# Patient Record
Sex: FEMALE | Race: WHITE | NOT HISPANIC OR LATINO | Employment: OTHER | URBAN - METROPOLITAN AREA
[De-identification: names, ages, dates, MRNs, and addresses within clinical notes are randomized per-mention and may not be internally consistent; named-entity substitution may affect disease eponyms.]

---

## 2023-04-04 LAB
THYROTROPIN (MIU/L) IN SER/PLAS BY DETECTION LIMIT <= 0.05 MIU/L: 5.55 MIU/L (ref 0.44–3.98)
THYROXINE (T4) FREE (NG/DL) IN SER/PLAS: 1.14 NG/DL (ref 0.78–1.48)
TRIIODOTHYRONINE (T3) (NG/DL) IN SER/PLAS: 81 NG/DL (ref 60–200)

## 2023-04-10 PROBLEM — Z90.89 STATUS POST TOTAL THYROIDECTOMY: Status: ACTIVE | Noted: 2023-04-10

## 2023-04-10 PROBLEM — Z98.890 STATUS POST TOTAL THYROIDECTOMY: Status: ACTIVE | Noted: 2023-04-10

## 2023-04-10 PROBLEM — E55.9 VITAMIN D DEFICIENCY: Status: ACTIVE | Noted: 2023-04-10

## 2023-04-10 PROBLEM — L50.9 HIVES: Status: ACTIVE | Noted: 2023-04-10

## 2023-04-10 PROBLEM — R11.0 NAUSEA IN ADULT: Status: ACTIVE | Noted: 2023-04-10

## 2023-04-10 PROBLEM — R00.2 PALPITATIONS: Status: ACTIVE | Noted: 2023-04-10

## 2023-04-10 PROBLEM — E89.0 STATUS POST TOTAL THYROIDECTOMY: Status: ACTIVE | Noted: 2023-04-10

## 2023-04-10 PROBLEM — L27.0 DRUG RASH: Status: ACTIVE | Noted: 2023-04-10

## 2023-04-10 RX ORDER — ACETAMINOPHEN 500 MG
1 TABLET ORAL DAILY
COMMUNITY
Start: 2022-05-26 | End: 2024-02-08 | Stop reason: WASHOUT

## 2023-04-10 RX ORDER — LEVOTHYROXINE SODIUM 75 UG/1
1 TABLET ORAL DAILY
COMMUNITY
End: 2023-10-02 | Stop reason: SDUPTHER

## 2023-04-10 RX ORDER — CALCIUM CARBONATE 500(1250)
2 TABLET ORAL 3 TIMES DAILY
COMMUNITY
Start: 2022-03-15 | End: 2024-02-08 | Stop reason: WASHOUT

## 2023-04-11 ENCOUNTER — APPOINTMENT (OUTPATIENT)
Dept: PRIMARY CARE | Facility: CLINIC | Age: 63
End: 2023-04-11
Payer: COMMERCIAL

## 2023-04-20 ENCOUNTER — OFFICE VISIT (OUTPATIENT)
Dept: PRIMARY CARE | Facility: CLINIC | Age: 63
End: 2023-04-20
Payer: COMMERCIAL

## 2023-04-20 VITALS
BODY MASS INDEX: 16.12 KG/M2 | DIASTOLIC BLOOD PRESSURE: 94 MMHG | TEMPERATURE: 97.4 F | WEIGHT: 91 LBS | SYSTOLIC BLOOD PRESSURE: 182 MMHG

## 2023-04-20 DIAGNOSIS — I10 HYPERTENSION, UNSPECIFIED TYPE: ICD-10-CM

## 2023-04-20 DIAGNOSIS — E03.8 OTHER SPECIFIED HYPOTHYROIDISM: ICD-10-CM

## 2023-04-20 DIAGNOSIS — N30.00 ACUTE CYSTITIS WITHOUT HEMATURIA: Primary | ICD-10-CM

## 2023-04-20 PROBLEM — L27.0 DRUG RASH: Status: RESOLVED | Noted: 2023-04-10 | Resolved: 2023-04-20

## 2023-04-20 LAB
POC APPEARANCE, URINE: CLEAR
POC BILIRUBIN, URINE: NEGATIVE
POC BLOOD, URINE: NEGATIVE
POC COLOR, URINE: YELLOW
POC GLUCOSE, URINE: NEGATIVE MG/DL
POC KETONES, URINE: NEGATIVE MG/DL
POC LEUKOCYTES, URINE: NEGATIVE
POC NITRITE,URINE: NEGATIVE
POC PH, URINE: 6.5 PH
POC PROTEIN, URINE: NEGATIVE MG/DL
POC SPECIFIC GRAVITY, URINE: <=1.005
POC UROBILINOGEN, URINE: 0.2 EU/DL

## 2023-04-20 PROCEDURE — 3080F DIAST BP >= 90 MM HG: CPT | Performed by: INTERNAL MEDICINE

## 2023-04-20 PROCEDURE — 81002 URINALYSIS NONAUTO W/O SCOPE: CPT | Performed by: INTERNAL MEDICINE

## 2023-04-20 PROCEDURE — 3077F SYST BP >= 140 MM HG: CPT | Performed by: INTERNAL MEDICINE

## 2023-04-20 PROCEDURE — 99214 OFFICE O/P EST MOD 30 MIN: CPT | Performed by: INTERNAL MEDICINE

## 2023-04-20 RX ORDER — LOSARTAN POTASSIUM 50 MG/1
50 TABLET ORAL DAILY
Qty: 30 TABLET | Refills: 4 | Status: SHIPPED | OUTPATIENT
Start: 2023-04-20 | End: 2023-05-04 | Stop reason: SDUPTHER

## 2023-04-20 RX ORDER — LEVOTHYROXINE SODIUM 50 UG/1
50 TABLET ORAL
COMMUNITY
Start: 2023-03-21 | End: 2023-10-09

## 2023-04-20 ASSESSMENT — ENCOUNTER SYMPTOMS
HEADACHES: 0
DYSURIA: 0
CONSTIPATION: 0
FEVER: 0
DIARRHEA: 0
NECK PAIN: 0
FREQUENCY: 0
COUGH: 0
ARTHRALGIAS: 0
BACK PAIN: 1
SHORTNESS OF BREATH: 0
BLOOD IN STOOL: 0
DIZZINESS: 0
PALPITATIONS: 0
MYALGIAS: 0
ABDOMINAL PAIN: 0
FATIGUE: 0
CHILLS: 0

## 2023-04-20 NOTE — PROGRESS NOTES
Subjective   Patient ID: Olga Grajeda is a 62 y.o. female.    HPI today for follow-up on uti  She was prescribed antibiotic which she completed. she states that overall symptoms are better.  She would like to get her urine tested in order to see resolution of her UTI.    Review of Systems   Constitutional:  Negative for chills, fatigue and fever.   Respiratory:  Negative for cough and shortness of breath.    Cardiovascular:  Negative for chest pain, palpitations and leg swelling.   Gastrointestinal:  Negative for abdominal pain, blood in stool, constipation and diarrhea.   Endocrine: Negative for cold intolerance and heat intolerance.   Genitourinary:  Negative for dysuria and frequency.   Musculoskeletal:  Positive for back pain. Negative for arthralgias, myalgias and neck pain.   Neurological:  Negative for dizziness and headaches.       Objective   Physical Exam  Vitals reviewed.   Constitutional:       General: She is not in acute distress.     Appearance: Normal appearance.   HENT:      Head: Normocephalic and atraumatic.      Mouth/Throat:      Mouth: Mucous membranes are moist.   Eyes:      Extraocular Movements: Extraocular movements intact.      Conjunctiva/sclera: Conjunctivae normal.      Pupils: Pupils are equal, round, and reactive to light.   Cardiovascular:      Rate and Rhythm: Normal rate and regular rhythm.      Pulses: Normal pulses.   Pulmonary:      Effort: Pulmonary effort is normal. No respiratory distress.      Breath sounds: Normal breath sounds.   Abdominal:      General: Bowel sounds are normal. There is no distension.      Tenderness: There is no abdominal tenderness.   Musculoskeletal:         General: No swelling or tenderness. Normal range of motion.      Cervical back: Normal range of motion.   Skin:     General: Skin is warm.   Neurological:      General: No focal deficit present.      Mental Status: She is alert.      Coordination: Coordination normal.      Gait: Gait normal.    Psychiatric:         Mood and Affect: Mood normal.         Behavior: Behavior normal.         Assessment/Plan   Diagnoses and all orders for this visit:  Acute cystitis without hematuria  Comments:  Recent UTI with antibiotic course completed  Check urinalysis today- normal  monitor sx  Orders:  -     POCT UA (nonautomated) manually resulted  Other specified hypothyroidism  Comments:  Continue with levothyroxine  Follows endocrinology  Hypertension, unspecified type  Comments:  Extremely elevated blood pressure today  Start losartan 50 mg once a day  Patient to follow-up with PCP in 1 month  Orders:  -     losartan (Cozaar) 50 mg tablet; Take 1 tablet (50 mg) by mouth once daily.

## 2023-04-20 NOTE — PATIENT INSTRUCTIONS
You are seen today for follow-up on UTI  Your urine test is normal  Your blood pressure was extremely elevated  Start losartan 1 tablet daily for your blood pressure  Follow-up with PCP in 4 weeks

## 2023-05-02 ENCOUNTER — TELEPHONE (OUTPATIENT)
Dept: PRIMARY CARE | Facility: CLINIC | Age: 63
End: 2023-05-02
Payer: COMMERCIAL

## 2023-05-02 NOTE — TELEPHONE ENCOUNTER
Patient was recently seen in the ER 5/1/23 for elevated B/P. She is very concerned and wants to be seen sooner than next availability in June. Do you recommend she see another provider in the office?

## 2023-05-03 ENCOUNTER — APPOINTMENT (OUTPATIENT)
Dept: PRIMARY CARE | Facility: CLINIC | Age: 63
End: 2023-05-03
Payer: COMMERCIAL

## 2023-05-04 ENCOUNTER — OFFICE VISIT (OUTPATIENT)
Dept: PRIMARY CARE | Facility: CLINIC | Age: 63
End: 2023-05-04
Payer: COMMERCIAL

## 2023-05-04 VITALS
HEIGHT: 63 IN | WEIGHT: 95 LBS | SYSTOLIC BLOOD PRESSURE: 156 MMHG | BODY MASS INDEX: 16.83 KG/M2 | DIASTOLIC BLOOD PRESSURE: 90 MMHG

## 2023-05-04 DIAGNOSIS — I10 PRIMARY HYPERTENSION: Primary | ICD-10-CM

## 2023-05-04 DIAGNOSIS — I10 HYPERTENSION, UNSPECIFIED TYPE: ICD-10-CM

## 2023-05-04 PROCEDURE — 3077F SYST BP >= 140 MM HG: CPT | Performed by: INTERNAL MEDICINE

## 2023-05-04 PROCEDURE — 3080F DIAST BP >= 90 MM HG: CPT | Performed by: INTERNAL MEDICINE

## 2023-05-04 PROCEDURE — 99213 OFFICE O/P EST LOW 20 MIN: CPT | Performed by: INTERNAL MEDICINE

## 2023-05-04 RX ORDER — AMLODIPINE BESYLATE 5 MG/1
5 TABLET ORAL DAILY
Qty: 90 TABLET | Refills: 1 | Status: SHIPPED | OUTPATIENT
Start: 2023-05-04 | End: 2023-09-26

## 2023-05-04 RX ORDER — LOSARTAN POTASSIUM 100 MG/1
100 TABLET ORAL DAILY
Qty: 90 TABLET | Refills: 3 | Status: SHIPPED | OUTPATIENT
Start: 2023-05-04 | End: 2023-08-29 | Stop reason: SDUPTHER

## 2023-05-04 ASSESSMENT — ENCOUNTER SYMPTOMS
PALPITATIONS: 0
CHILLS: 0
SHORTNESS OF BREATH: 0
COUGH: 0
FEVER: 0
POLYDIPSIA: 0

## 2023-05-04 NOTE — PROGRESS NOTES
"Subjective   Patient ID: Olga Grajeda is a 62 y.o. female who presents for No chief complaint on file..    Blood pressure follow-up, post ER blood pressure 180 systolic or higher.  Extended hydralazine.  Increase her Cozaar upon leaving but no new meds started.  Blood pressure is noted today.  Symptoms of chest pain have improved on higher doses and she is tolerating it well.  Medication discussed offered and sent to pharmacy patient will resume home checking and update me accordingly.    Patient also with active symptoms of anxiety considering treatment but not ready at this exact moment we will update me further if needed         Review of Systems   Constitutional:  Negative for chills and fever.   Respiratory:  Negative for cough and shortness of breath.    Cardiovascular:  Negative for chest pain and palpitations.   Endocrine: Negative for polydipsia and polyuria.       Objective   /90   Ht 1.6 m (5' 3\")   Wt (!) 43.1 kg (95 lb)   BMI 16.83 kg/m²     Physical Exam  Constitutional:       Appearance: Normal appearance.   HENT:      Head: Normocephalic and atraumatic.   Eyes:      Extraocular Movements: Extraocular movements intact.      Pupils: Pupils are equal, round, and reactive to light.   Neck:      Thyroid: No thyroid mass or thyromegaly.      Vascular: No carotid bruit.   Cardiovascular:      Rate and Rhythm: Normal rate and regular rhythm.      Heart sounds: No murmur heard.     No friction rub. No gallop.   Pulmonary:      Effort: No respiratory distress.      Breath sounds: No wheezing, rhonchi or rales.   Musculoskeletal:      Cervical back: Neck supple.      Right lower leg: No edema.      Left lower leg: No edema.   Lymphadenopathy:      Cervical: No cervical adenopathy.   Neurological:      Mental Status: She is alert.         Assessment/Plan   Problem List Items Addressed This Visit          Circulatory    Primary hypertension - Primary    Relevant Medications    amLODIPine (Norvasc) " 5 mg tablet     Other Visit Diagnoses       Hypertension, unspecified type        Extremely elevated blood pressure today  Start losartan 50 mg once a day  Patient to follow-up with PCP in 1 month    Relevant Medications    losartan (Cozaar) 100 mg tablet

## 2023-06-01 ENCOUNTER — APPOINTMENT (OUTPATIENT)
Dept: PRIMARY CARE | Facility: CLINIC | Age: 63
End: 2023-06-01
Payer: COMMERCIAL

## 2023-06-13 LAB
ALBUMIN (G/DL) IN SER/PLAS: 4.6 G/DL (ref 3.4–5)
ANION GAP IN SER/PLAS: 12 MMOL/L (ref 10–20)
CALCIDIOL (25 OH VITAMIN D3) (NG/ML) IN SER/PLAS: 34 NG/ML
CALCIUM (MG/DL) IN SER/PLAS: 9.4 MG/DL (ref 8.6–10.6)
CARBON DIOXIDE, TOTAL (MMOL/L) IN SER/PLAS: 32 MMOL/L (ref 21–32)
CHLORIDE (MMOL/L) IN SER/PLAS: 104 MMOL/L (ref 98–107)
CREATININE (MG/DL) IN SER/PLAS: 0.6 MG/DL (ref 0.5–1.05)
GFR FEMALE: >90 ML/MIN/1.73M2
GLUCOSE (MG/DL) IN SER/PLAS: 59 MG/DL (ref 74–99)
PHOSPHATE (MG/DL) IN SER/PLAS: 4 MG/DL (ref 2.5–4.9)
POTASSIUM (MMOL/L) IN SER/PLAS: 4.4 MMOL/L (ref 3.5–5.3)
SODIUM (MMOL/L) IN SER/PLAS: 144 MMOL/L (ref 136–145)
THYROTROPIN (MIU/L) IN SER/PLAS BY DETECTION LIMIT <= 0.05 MIU/L: 3.12 MIU/L (ref 0.44–3.98)
THYROXINE (T4) FREE (NG/DL) IN SER/PLAS: 1.25 NG/DL (ref 0.78–1.48)
UREA NITROGEN (MG/DL) IN SER/PLAS: 9 MG/DL (ref 6–23)

## 2023-08-17 ENCOUNTER — OFFICE VISIT (OUTPATIENT)
Dept: PRIMARY CARE | Facility: CLINIC | Age: 63
End: 2023-08-17
Payer: COMMERCIAL

## 2023-08-17 VITALS
HEART RATE: 90 BPM | WEIGHT: 93.9 LBS | SYSTOLIC BLOOD PRESSURE: 174 MMHG | BODY MASS INDEX: 16.64 KG/M2 | HEIGHT: 63 IN | TEMPERATURE: 97.6 F | DIASTOLIC BLOOD PRESSURE: 93 MMHG

## 2023-08-17 DIAGNOSIS — R10.11 RUQ PAIN: ICD-10-CM

## 2023-08-17 DIAGNOSIS — D41.4 BLADDER POLYPS: ICD-10-CM

## 2023-08-17 DIAGNOSIS — R10.13 EPIGASTRIC PAIN: Primary | ICD-10-CM

## 2023-08-17 DIAGNOSIS — R11.0 NAUSEA: ICD-10-CM

## 2023-08-17 PROCEDURE — 3080F DIAST BP >= 90 MM HG: CPT | Performed by: INTERNAL MEDICINE

## 2023-08-17 PROCEDURE — 3077F SYST BP >= 140 MM HG: CPT | Performed by: INTERNAL MEDICINE

## 2023-08-17 PROCEDURE — 4004F PT TOBACCO SCREEN RCVD TLK: CPT | Performed by: INTERNAL MEDICINE

## 2023-08-17 PROCEDURE — 99214 OFFICE O/P EST MOD 30 MIN: CPT | Performed by: INTERNAL MEDICINE

## 2023-08-17 RX ORDER — PANTOPRAZOLE SODIUM 40 MG/1
40 TABLET, DELAYED RELEASE ORAL 2 TIMES DAILY
Qty: 90 TABLET | Refills: 0 | Status: SHIPPED | OUTPATIENT
Start: 2023-08-17 | End: 2023-08-29 | Stop reason: ALTCHOICE

## 2023-08-17 RX ORDER — FAMOTIDINE 40 MG/1
40 TABLET, FILM COATED ORAL NIGHTLY
Qty: 90 TABLET | Refills: 0 | Status: SHIPPED | OUTPATIENT
Start: 2023-08-17 | End: 2023-08-29 | Stop reason: SDUPTHER

## 2023-08-17 RX ORDER — SUCRALFATE 1 G/1
1 TABLET ORAL
Qty: 28 TABLET | Refills: 0 | Status: SHIPPED | OUTPATIENT
Start: 2023-08-17 | End: 2023-08-29 | Stop reason: ALTCHOICE

## 2023-08-17 ASSESSMENT — ENCOUNTER SYMPTOMS
DIARRHEA: 0
ABDOMINAL PAIN: 1
PALPITATIONS: 0
NAUSEA: 1
PAIN: 1
SHORTNESS OF BREATH: 0
BLOOD IN STOOL: 0
POLYDIPSIA: 0
COUGH: 0
FEVER: 0
CONSTIPATION: 0
CHILLS: 0
ABDOMINAL DISTENTION: 1
VOMITING: 0

## 2023-08-17 NOTE — PROGRESS NOTES
"Subjective   Patient ID: Olga Grajeda is a 62 y.o. female who presents for Follow-up and Pain.    Bilateral upper abdomen pain, onset mid may. Progressing since mid may pain escalated to the point she made a trip to the emergency room with a detailed evaluation that included negative cardiac.  Examination normal liver and pancreatic enzymes and a negative CT abdomen.  She was found to be positive for UTI and treated by Macrobid.  She has been having epigastric pain, nausea recurrently without symptoms of diarrhea constipation blood in the stool vomiting.  She has a big gastric discomfort and reports symptoms of bloating.  There is no radiation to the chest jaw or back.  She does smoke.  On examination today the area of greatest pain with a little involuntary guarding is the right upper quadrant over the gallbladder.    Pain  Associated symptoms include abdominal pain and nausea. Pertinent negatives include no chest pain, constipation, diarrhea, fever, shortness of breath or vomiting.        Review of Systems   Constitutional:  Negative for chills and fever.   Respiratory:  Negative for cough and shortness of breath.    Cardiovascular:  Negative for chest pain and palpitations.   Gastrointestinal:  Positive for abdominal distention, abdominal pain and nausea. Negative for blood in stool, constipation, diarrhea and vomiting.   Endocrine: Negative for polydipsia and polyuria.       Objective   BP (!) 174/93 (BP Location: Left arm, Patient Position: Sitting, BP Cuff Size: Small adult)   Pulse 90   Temp 36.4 °C (97.6 °F) (Temporal)   Ht 1.6 m (5' 3\")   Wt (!) 42.6 kg (93 lb 14.4 oz)   BMI 16.63 kg/m²     Physical Exam  Constitutional:       Appearance: Normal appearance.   HENT:      Head: Normocephalic and atraumatic.   Eyes:      Extraocular Movements: Extraocular movements intact.      Pupils: Pupils are equal, round, and reactive to light.   Neck:      Thyroid: No thyroid mass or thyromegaly.      " Vascular: No carotid bruit.   Cardiovascular:      Rate and Rhythm: Normal rate and regular rhythm.      Heart sounds: No murmur heard.     No friction rub. No gallop.   Pulmonary:      Effort: No respiratory distress.      Breath sounds: No wheezing, rhonchi or rales.   Abdominal:      General: There is distension.      Tenderness: There is abdominal tenderness (RUQ greatest). There is guarding (RUQ). There is no rebound.   Musculoskeletal:      Cervical back: Neck supple.      Right lower leg: No edema.      Left lower leg: No edema.   Lymphadenopathy:      Cervical: No cervical adenopathy.   Neurological:      Mental Status: She is alert.         Assessment/Plan   Problem List Items Addressed This Visit    None  Visit Diagnoses       Epigastric pain    -  Primary    Relevant Medications    sucralfate (Carafate) 1 gram tablet    pantoprazole (ProtoNix) 40 mg EC tablet    famotidine (Pepcid) 40 mg tablet    Other Relevant Orders    NM hepatobiliary w cholecystokinin    Bladder polyps        RUQ pain        Relevant Orders    NM hepatobiliary w cholecystokinin    Nausea        Relevant Orders    NM hepatobiliary w cholecystokinin

## 2023-08-25 DIAGNOSIS — T78.40XA ALLERGIC REACTION, INITIAL ENCOUNTER: Primary | ICD-10-CM

## 2023-08-25 DIAGNOSIS — K59.00 CONSTIPATION, UNSPECIFIED CONSTIPATION TYPE: ICD-10-CM

## 2023-08-25 RX ORDER — SENNOSIDES 8.6 MG/1
8.6 CAPSULE, GELATIN COATED ORAL NIGHTLY PRN
Qty: 10 CAPSULE | Refills: 0 | Status: SHIPPED | OUTPATIENT
Start: 2023-08-25 | End: 2024-02-08 | Stop reason: WASHOUT

## 2023-08-25 RX ORDER — HYDROXYZINE PAMOATE 25 MG/1
25 CAPSULE ORAL 3 TIMES DAILY PRN
Qty: 9 CAPSULE | Refills: 0 | Status: SHIPPED | OUTPATIENT
Start: 2023-08-25 | End: 2023-08-29 | Stop reason: ALTCHOICE

## 2023-08-25 RX ORDER — DOCUSATE SODIUM 250 MG
1 CAPSULE ORAL DAILY
Qty: 30 CAPSULE | Refills: 0 | Status: SHIPPED | OUTPATIENT
Start: 2023-08-25 | End: 2024-02-08 | Stop reason: WASHOUT

## 2023-08-25 RX ORDER — PREDNISONE 20 MG/1
40 TABLET ORAL DAILY
Qty: 10 TABLET | Refills: 0 | Status: SHIPPED | OUTPATIENT
Start: 2023-08-25 | End: 2023-08-30

## 2023-08-29 ENCOUNTER — OFFICE VISIT (OUTPATIENT)
Dept: PRIMARY CARE | Facility: CLINIC | Age: 63
End: 2023-08-29
Payer: COMMERCIAL

## 2023-08-29 VITALS — TEMPERATURE: 97.7 F | HEART RATE: 97 BPM | SYSTOLIC BLOOD PRESSURE: 151 MMHG | DIASTOLIC BLOOD PRESSURE: 86 MMHG

## 2023-08-29 DIAGNOSIS — R06.00 DYSPNEA, UNSPECIFIED TYPE: ICD-10-CM

## 2023-08-29 DIAGNOSIS — R10.11 RUQ PAIN: ICD-10-CM

## 2023-08-29 DIAGNOSIS — R11.0 NAUSEA: ICD-10-CM

## 2023-08-29 DIAGNOSIS — I10 HYPERTENSION, UNSPECIFIED TYPE: ICD-10-CM

## 2023-08-29 DIAGNOSIS — Z72.0 TOBACCO USE: ICD-10-CM

## 2023-08-29 DIAGNOSIS — R10.13 EPIGASTRIC PAIN: Primary | ICD-10-CM

## 2023-08-29 PROCEDURE — 3077F SYST BP >= 140 MM HG: CPT | Performed by: INTERNAL MEDICINE

## 2023-08-29 PROCEDURE — 99213 OFFICE O/P EST LOW 20 MIN: CPT | Performed by: INTERNAL MEDICINE

## 2023-08-29 PROCEDURE — 3079F DIAST BP 80-89 MM HG: CPT | Performed by: INTERNAL MEDICINE

## 2023-08-29 PROCEDURE — 4004F PT TOBACCO SCREEN RCVD TLK: CPT | Performed by: INTERNAL MEDICINE

## 2023-08-29 RX ORDER — FLUTICASONE FUROATE, UMECLIDINIUM BROMIDE AND VILANTEROL TRIFENATATE 200; 62.5; 25 UG/1; UG/1; UG/1
1 POWDER RESPIRATORY (INHALATION) DAILY
Qty: 1 EACH | Refills: 0 | Status: ON HOLD | COMMUNITY
Start: 2023-08-29 | End: 2024-03-19 | Stop reason: ALTCHOICE

## 2023-08-29 RX ORDER — ONDANSETRON 4 MG/1
4 TABLET, ORALLY DISINTEGRATING ORAL EVERY 8 HOURS PRN
COMMUNITY
Start: 2023-08-16 | End: 2024-02-08 | Stop reason: WASHOUT

## 2023-08-29 RX ORDER — LOSARTAN POTASSIUM 50 MG/1
50 TABLET ORAL 2 TIMES DAILY
Qty: 180 TABLET | Refills: 3 | Status: SHIPPED | OUTPATIENT
Start: 2023-08-29 | End: 2024-08-28

## 2023-08-29 RX ORDER — FAMOTIDINE 40 MG/1
40 TABLET, FILM COATED ORAL 2 TIMES DAILY
Qty: 90 TABLET | Refills: 1 | Status: SHIPPED | OUTPATIENT
Start: 2023-08-29 | End: 2023-12-05 | Stop reason: SDUPTHER

## 2023-08-29 ASSESSMENT — ENCOUNTER SYMPTOMS
CHILLS: 0
FEVER: 0
COUGH: 0
PALPITATIONS: 0
SHORTNESS OF BREATH: 0
POLYDIPSIA: 0

## 2023-08-29 NOTE — PROGRESS NOTES
Subjective   Patient ID: Olga Grajeda is a 62 y.o. female who presents for Follow-up (EXTREME PAIN).    62-year-old female presents in close follow-up after complications of prior treatment plan.  She developed what appears to be a drug rash and now fine lacy erythematous papular rash diffusely on her body that was itchy.  Resolved quickly upon discontinuation of pantoprazole and sucralfate.  Also Vistaril helped a lot.  She never took the steroid.    Previous issues as discussed at prior appointment continue to be present.  Patient also has some experience of respiratory symptoms like shortness of breath associated with her chronic tobacco use.  Trial of COPD inhaler medication was provided today to evaluate for if there is any contribution on COPD and the patient's current symptom pathway.  I think this is possible for some of the epigastric symptoms but most likely not significantly related as bloating abdominal discomfort constipation and many of the other symptoms the patient expresses would not typically be associated with COPD.  She has a HIDA scan scheduled for next week again this was because a significant amount of her symptoms resulted from right upper abdomen pain right upper quadrant pain nausea postprandial with food intolerance.         Review of Systems   Constitutional:  Negative for chills and fever.   Respiratory:  Negative for cough and shortness of breath.    Cardiovascular:  Negative for chest pain and palpitations.   Endocrine: Negative for polydipsia and polyuria.       Objective   /86 (BP Location: Right arm, Patient Position: Sitting, BP Cuff Size: Small child)   Pulse 97   Temp 36.5 °C (97.7 °F) (Temporal)     Physical Exam  Constitutional:       Appearance: Normal appearance.   HENT:      Head: Normocephalic and atraumatic.   Eyes:      Extraocular Movements: Extraocular movements intact.      Pupils: Pupils are equal, round, and reactive to light.   Neck:      Thyroid: No  thyroid mass or thyromegaly.      Vascular: No carotid bruit.   Cardiovascular:      Rate and Rhythm: Normal rate and regular rhythm.      Heart sounds: No murmur heard.     No friction rub. No gallop.   Pulmonary:      Effort: No respiratory distress.      Breath sounds: No wheezing, rhonchi or rales.   Musculoskeletal:      Cervical back: Neck supple.      Right lower leg: No edema.      Left lower leg: No edema.   Lymphadenopathy:      Cervical: No cervical adenopathy.   Neurological:      Mental Status: She is alert.         Assessment/Plan   Problem List Items Addressed This Visit    None  Visit Diagnoses       Epigastric pain    -  Primary    Relevant Medications    famotidine (Pepcid) 40 mg tablet    RUQ pain        Nausea        Tobacco use        Dyspnea, unspecified type        Hypertension, unspecified type        Extremely elevated blood pressure today  Start losartan 50 mg once a day  Patient to follow-up with PCP in 1 month    Relevant Medications    losartan (Cozaar) 50 mg tablet

## 2023-08-30 LAB — URINE CULTURE: NORMAL

## 2023-09-05 DIAGNOSIS — R10.13 EPIGASTRIC PAIN: Primary | ICD-10-CM

## 2023-09-05 NOTE — RESULT ENCOUNTER NOTE
Patient results of HIDA scan are as follows: Gallbladder ejection fraction and function is rated to be just below the normal level 37% when normal is considered above 38%.  Based on patient's presentation of symptoms and advising the following: With the significance of the symptoms and the multiple possible etiologies, before we go talking about a gallbladder removal with the surgeon I did advise seeing a gastroenterologist and considering an endoscopy to evaluate the stomach.  However this testing does show that the gallbladder could be very much involved and pending the results of an endoscopy with the GI specialist intervention against that with the surgeon may be necessary for long-term treatment.

## 2023-09-08 ENCOUNTER — HOSPITAL ENCOUNTER (OUTPATIENT)
Dept: DATA CONVERSION | Facility: HOSPITAL | Age: 63
Discharge: HOME | End: 2023-09-08
Payer: COMMERCIAL

## 2023-09-08 DIAGNOSIS — D49.4 NEOPLASM OF UNSPECIFIED BEHAVIOR OF BLADDER: ICD-10-CM

## 2023-09-08 DIAGNOSIS — Z01.818 ENCOUNTER FOR OTHER PREPROCEDURAL EXAMINATION: ICD-10-CM

## 2023-09-08 LAB
ALBUMIN SERPL-MCNC: 4.8 GM/DL (ref 3.5–5)
ALBUMIN/GLOB SERPL: 1.6 RATIO (ref 1.5–3)
ALP BLD-CCNC: 94 U/L (ref 35–125)
ALT SERPL-CCNC: 14 U/L (ref 5–40)
ANION GAP SERPL CALCULATED.3IONS-SCNC: 10 MMOL/L (ref 0–19)
ANTICOAGULANT: NORMAL
APTT PPP: 27 SEC (ref 22–32.5)
AST SERPL-CCNC: 18 U/L (ref 5–40)
BASOPHILS # BLD AUTO: 0.1 K/UL (ref 0–0.22)
BASOPHILS NFR BLD AUTO: 1 % (ref 0–1)
BILIRUB SERPL-MCNC: 0.5 MG/DL (ref 0.1–1.2)
BUN SERPL-MCNC: 8 MG/DL (ref 8–25)
BUN/CREAT SERPL: 11.4 RATIO (ref 8–21)
CALCIUM SERPL-MCNC: 9.5 MG/DL (ref 8.5–10.4)
CHLORIDE SERPL-SCNC: 103 MMOL/L (ref 97–107)
CO2 SERPL-SCNC: 28 MMOL/L (ref 24–31)
CREAT SERPL-MCNC: 0.7 MG/DL (ref 0.4–1.6)
DEPRECATED RDW RBC AUTO: 34.9 FL (ref 37–54)
DIFFERENTIAL METHOD BLD: ABNORMAL
EOSINOPHIL # BLD AUTO: 0.19 K/UL (ref 0–0.45)
EOSINOPHIL NFR BLD: 1.9 % (ref 0–3)
ERYTHROCYTE [DISTWIDTH] IN BLOOD BY AUTOMATED COUNT: 16.4 % (ref 11.7–15)
GFR SERPL CREATININE-BSD FRML MDRD: 98 ML/MIN/1.73 M2
GLOBULIN SER-MCNC: 3 G/DL (ref 1.9–3.7)
GLUCOSE SERPL-MCNC: 102 MG/DL (ref 65–99)
HCT VFR BLD AUTO: 43.3 % (ref 36–44)
HGB BLD-MCNC: 13.3 GM/DL (ref 12–15)
IMM GRANULOCYTES # BLD AUTO: 0.01 K/UL (ref 0–0.1)
INR PPP: 1 (ref 0.86–1.16)
LYMPHOCYTES # BLD AUTO: 0.95 K/UL (ref 1.2–3.2)
LYMPHOCYTES NFR BLD MANUAL: 9.5 % (ref 20–40)
MAGNESIUM SERPL-MCNC: 2.1 MG/DL (ref 1.6–3.1)
MCH RBC QN AUTO: 20.1 PG (ref 26–34)
MCHC RBC AUTO-ENTMCNC: 30.7 % (ref 31–37)
MCV RBC AUTO: 65.4 FL (ref 80–100)
MONOCYTES # BLD AUTO: 0.86 K/UL (ref 0–0.8)
MONOCYTES NFR BLD MANUAL: 8.6 % (ref 0–8)
NEUTROPHILS # BLD AUTO: 7.92 K/UL
NEUTROPHILS # BLD AUTO: 7.92 K/UL (ref 1.8–7.7)
NEUTROPHILS.IMMATURE NFR BLD: 0.1 % (ref 0–1)
NEUTS SEG NFR BLD: 78.9 % (ref 50–70)
PLATELET # BLD AUTO: 408 K/UL (ref 150–450)
PMV BLD AUTO: 10.3 CU (ref 7–12.6)
POTASSIUM SERPL-SCNC: 3.9 MMOL/L (ref 3.4–5.1)
PROT SERPL-MCNC: 7.8 G/DL (ref 5.9–7.9)
PROTHROMBIN TIME: 9.9 SEC (ref 9.3–12.7)
RBC # BLD AUTO: 6.62 M/UL (ref 4–4.9)
SODIUM SERPL-SCNC: 141 MMOL/L (ref 133–145)
WBC # BLD AUTO: 10 K/UL (ref 4.5–11)

## 2023-09-19 ENCOUNTER — HOSPITAL ENCOUNTER (OUTPATIENT)
Dept: DATA CONVERSION | Facility: HOSPITAL | Age: 63
Discharge: HOME | End: 2023-09-19
Payer: COMMERCIAL

## 2023-09-19 DIAGNOSIS — E03.9 HYPOTHYROIDISM, UNSPECIFIED: ICD-10-CM

## 2023-09-19 DIAGNOSIS — D49.4 NEOPLASM OF UNSPECIFIED BEHAVIOR OF BLADDER: ICD-10-CM

## 2023-09-19 DIAGNOSIS — F17.210 NICOTINE DEPENDENCE, CIGARETTES, UNCOMPLICATED: ICD-10-CM

## 2023-09-19 DIAGNOSIS — J44.9 CHRONIC OBSTRUCTIVE PULMONARY DISEASE, UNSPECIFIED (MULTI): ICD-10-CM

## 2023-09-19 DIAGNOSIS — K21.9 GASTRO-ESOPHAGEAL REFLUX DISEASE WITHOUT ESOPHAGITIS: ICD-10-CM

## 2023-09-19 DIAGNOSIS — D09.0 CARCINOMA IN SITU OF BLADDER: ICD-10-CM

## 2023-09-19 DIAGNOSIS — I10 ESSENTIAL (PRIMARY) HYPERTENSION: ICD-10-CM

## 2023-09-20 ENCOUNTER — APPOINTMENT (OUTPATIENT)
Dept: PRIMARY CARE | Facility: CLINIC | Age: 63
End: 2023-09-20
Payer: COMMERCIAL

## 2023-09-25 DIAGNOSIS — I10 PRIMARY HYPERTENSION: ICD-10-CM

## 2023-09-26 RX ORDER — AMLODIPINE BESYLATE 5 MG/1
5 TABLET ORAL DAILY
Qty: 90 TABLET | Refills: 1 | Status: SHIPPED | OUTPATIENT
Start: 2023-09-26 | End: 2024-04-26

## 2023-10-02 ENCOUNTER — TELEPHONE (OUTPATIENT)
Dept: PRIMARY CARE | Facility: HOSPITAL | Age: 63
End: 2023-10-02
Payer: COMMERCIAL

## 2023-10-02 DIAGNOSIS — E89.0 STATUS POST TOTAL THYROIDECTOMY: Primary | ICD-10-CM

## 2023-10-02 NOTE — TELEPHONE ENCOUNTER
Rx Refill Request Telephone Encounter    Name:  Olga Grajeda  :  [id number]  Medication Name:  Synthroid   50mcg/75mcg  Oral  Daily  90 days supply  Directions :    Specific Pharmacy location:  [facility]  Date of last appointment:    Date of next appointment:    Best number to reach patient:  [phone]

## 2023-10-03 NOTE — TELEPHONE ENCOUNTER
Patient called stating she has yet to receive the medication previously requested. She states she only has two pills left.

## 2023-10-05 ENCOUNTER — APPOINTMENT (OUTPATIENT)
Dept: PRIMARY CARE | Facility: CLINIC | Age: 63
End: 2023-10-05
Payer: COMMERCIAL

## 2023-10-05 ENCOUNTER — OFFICE VISIT (OUTPATIENT)
Dept: UROLOGY | Facility: CLINIC | Age: 63
End: 2023-10-05
Payer: COMMERCIAL

## 2023-10-05 VITALS — BODY MASS INDEX: 16.63 KG/M2 | HEIGHT: 63 IN | TEMPERATURE: 97.6 F

## 2023-10-05 DIAGNOSIS — C67.9 MALIGNANT NEOPLASM OF URINARY BLADDER, UNSPECIFIED SITE (MULTI): Primary | ICD-10-CM

## 2023-10-05 PROCEDURE — 4004F PT TOBACCO SCREEN RCVD TLK: CPT | Performed by: STUDENT IN AN ORGANIZED HEALTH CARE EDUCATION/TRAINING PROGRAM

## 2023-10-05 PROCEDURE — 99214 OFFICE O/P EST MOD 30 MIN: CPT | Performed by: STUDENT IN AN ORGANIZED HEALTH CARE EDUCATION/TRAINING PROGRAM

## 2023-10-05 ASSESSMENT — PAIN SCALES - GENERAL: PAINLEVEL: 0-NO PAIN

## 2023-10-05 NOTE — PROGRESS NOTES
HPI:  Proc (9/19/23): TURBT   Path: non-invasive papillary urothelial carcinoma, low-grade     63 year old female self-referred for bladder mass. Hx of HTN. Patient is a smoker. Recently seen in the ED (8/8/23) for upper abdominal pain, hematuria, and dysuria. CT AP (8/9/23) showed no evidence of acute abnormality in the abdomen/pelvis, partially visualized emphysematous lung changes, there is a 1.2 cm nodularity in the right posterior dependent portion of the bladder without associated bladder wall thickening, which is nonspecific. S/p TURBT (9/19/23) with pathology showing non-invasive papillary urothelial carcinoma, low-grade. Doing well.      Smoker   Hx: HTN  SHx: thyroidectomy (3/22)   FHx: father (cause of death) and brother had bladder cancer, both worked with chemicals.      CT AP (8/9/23): no evidence of acute abnormality in the abdomen/pelvis, partially visualized emphysematous lung changes, there is a 1.2 cm nodularity in the right posterior dependent portion of the bladder without associated bladder wall thickening, which is nonspecific.     Review of Systems:  All systems reviewed. Anything negative noted in the HPI.    Physical Exam:  Vitals signs reviewed.  Constitutional:      Appearance: Well-developed.  HENT:     Head: Normocephalic and atraumatic.  Neck:     Musculoskeletal: Normal range of motion.  Pulmonary:     Effort: Pulmonary effort is normal.  Musculoskeletal: Normal range of motion.  Skin:     General: Skin is warm and dry.  Neurological:     Mental Status: Alert and oriented to person, place, and time.  Psychiatric:        Behavior: Behavior normal.        Thought Content: Thought content normal.        Judgment: Judgment normal.  Abdominal:     Palpations: Abdomen is soft. There is no mass.     Tenderness: There is no tenderness. There is no guarding or rebound.     Hernia: No hernia is present.     Comments:     Assessment/Plan   63 year old female self-referred for bladder mass. Hx of  HTN. Patient is a smoker. Recently seen in the ED (8/8/23) for upper abdominal pain, hematuria, and dysuria. CT AP (8/9/23) showed no evidence of acute abnormality in the abdomen/pelvis, partially visualized emphysematous lung changes, there is a 1.2 cm nodularity in the right posterior dependent portion of the bladder without associated bladder wall thickening, which is nonspecific. S/p TURBT (9/19/23) with pathology showing non-invasive papillary urothelial carcinoma, low-grade. Doing well. Management options including risks, benefits and alternatives discussed at length and all questions answered. Patient prefers to proceed with follow-up in 3mos for cystoscopy.               By signing my name below, I, India Ledezma, attest that this documentation has been prepared under the direction and in the presence of Dr. Idris Gutierrez.  All medical record entries made by the Scribe were at my direction and personally dictated by me. I have reviewed the chart and agree that the record accurately reflects my personal performance of the history, physical exam, discussion and plan.

## 2023-10-09 RX ORDER — LEVOTHYROXINE SODIUM 75 UG/1
75 TABLET ORAL DAILY
Qty: 30 TABLET | Refills: 1 | Status: SHIPPED | OUTPATIENT
Start: 2023-10-09 | End: 2024-03-19

## 2023-10-10 ENCOUNTER — APPOINTMENT (OUTPATIENT)
Dept: PRIMARY CARE | Facility: CLINIC | Age: 63
End: 2023-10-10
Payer: COMMERCIAL

## 2023-10-26 ENCOUNTER — APPOINTMENT (OUTPATIENT)
Dept: PRIMARY CARE | Facility: CLINIC | Age: 63
End: 2023-10-26
Payer: COMMERCIAL

## 2023-11-09 ENCOUNTER — APPOINTMENT (OUTPATIENT)
Dept: PRIMARY CARE | Facility: CLINIC | Age: 63
End: 2023-11-09
Payer: COMMERCIAL

## 2023-12-05 ENCOUNTER — OFFICE VISIT (OUTPATIENT)
Dept: PRIMARY CARE | Facility: CLINIC | Age: 63
End: 2023-12-05
Payer: COMMERCIAL

## 2023-12-05 VITALS
DIASTOLIC BLOOD PRESSURE: 83 MMHG | BODY MASS INDEX: 15.77 KG/M2 | WEIGHT: 89 LBS | TEMPERATURE: 97.5 F | SYSTOLIC BLOOD PRESSURE: 133 MMHG | HEART RATE: 87 BPM

## 2023-12-05 DIAGNOSIS — K82.8 DYSFUNCTIONAL GALLBLADDER: ICD-10-CM

## 2023-12-05 DIAGNOSIS — K21.9 GASTROESOPHAGEAL REFLUX DISEASE WITHOUT ESOPHAGITIS: ICD-10-CM

## 2023-12-05 DIAGNOSIS — R11.0 NAUSEA IN ADULT: Primary | ICD-10-CM

## 2023-12-05 DIAGNOSIS — R10.13 EPIGASTRIC PAIN: ICD-10-CM

## 2023-12-05 PROBLEM — E03.2 HYPOTHYROIDISM, IATROGENIC: Status: ACTIVE | Noted: 2023-12-05

## 2023-12-05 PROCEDURE — 4004F PT TOBACCO SCREEN RCVD TLK: CPT | Performed by: INTERNAL MEDICINE

## 2023-12-05 PROCEDURE — 99214 OFFICE O/P EST MOD 30 MIN: CPT | Performed by: INTERNAL MEDICINE

## 2023-12-05 PROCEDURE — 3079F DIAST BP 80-89 MM HG: CPT | Performed by: INTERNAL MEDICINE

## 2023-12-05 PROCEDURE — 3075F SYST BP GE 130 - 139MM HG: CPT | Performed by: INTERNAL MEDICINE

## 2023-12-05 RX ORDER — ESOMEPRAZOLE MAGNESIUM 40 MG/1
40 CAPSULE, DELAYED RELEASE ORAL
Qty: 90 CAPSULE | Refills: 0 | Status: SHIPPED | OUTPATIENT
Start: 2023-12-05 | End: 2024-03-04

## 2023-12-05 RX ORDER — FAMOTIDINE 40 MG/1
40 TABLET, FILM COATED ORAL 2 TIMES DAILY
Qty: 90 TABLET | Refills: 1 | Status: SHIPPED | OUTPATIENT
Start: 2023-12-05 | End: 2024-03-18

## 2023-12-05 ASSESSMENT — ENCOUNTER SYMPTOMS
CHILLS: 0
SHORTNESS OF BREATH: 0
PAIN: 1
VOMITING: 0
DIARRHEA: 0
POLYDIPSIA: 0
COUGH: 0
PALPITATIONS: 0
ABDOMINAL PAIN: 1
CONSTIPATION: 0
FEVER: 0
NAUSEA: 1

## 2023-12-05 NOTE — PROGRESS NOTES
"Subjective   Patient ID: Olga Grajeda is a 63 y.o. female who presents for Follow-up.    Prior evaluation: \"Bilateral upper abdomen pain, onset mid may. Progressing since mid may pain escalated to the point she made a trip to the emergency room with a detailed evaluation that included negative cardiac.  Examination normal liver and pancreatic enzymes and a negative CT abdomen.  She was found to be positive for UTI and treated by Macrobid.  She has been having epigastric pain, nausea recurrently without symptoms of diarrhea constipation blood in the stool vomiting.  She has a big gastric discomfort and reports symptoms of bloating.  There is no radiation to the chest jaw or back.  She does smoke.  On examination today the area of greatest pain with a little involuntary guarding is the right upper quadrant over the gallbladder.\"    Since previous encounters the patient was intolerant of 2 of the 3 previous acid medications back in August due to developed drug rash.  She has remained on the Pepcid and does note there are benefits to taking it in reducing acid reflux and her overall symptom pattern.  Her biggest ongoing issues continue to be postprandial nausea and epigastric pain primarily right-sided but radiating to the left side.  It is most commonly experience with dinner.  She also notes that she has had to reduce to a much more bland diet as a result of these persistent symptoms.  Last night she ate sour cream and had stewart and today she feels more nauseous in general as a result.  Her HIDA scan was borderline at 37%.  Her upper endoscopy completed recently, although records were unavailable at today's visit for me to review and incorporate but were reported by the patient, was supposedly unremarkable with a negative celiac test, negative H. pylori test, and no reports of ulcer however I do not have the details of the report in full.      I advised the patient for second trial of an alternative PPI " reevaluation in 6 weeks and if not making progress we would need to consider alternatives to the gallbladder with a general surgeon for possible gallbladder dyskinesia which the patient was borderline for on her HIDA scan.    Pain  Associated symptoms include abdominal pain and nausea. Pertinent negatives include no chest pain, constipation, diarrhea, fever, shortness of breath or vomiting.        Review of Systems   Constitutional:  Negative for chills and fever.   Respiratory:  Negative for cough and shortness of breath.    Cardiovascular:  Negative for chest pain and palpitations.   Gastrointestinal:  Positive for abdominal pain and nausea. Negative for constipation, diarrhea and vomiting.   Endocrine: Negative for polydipsia and polyuria.       Objective   /83 (BP Location: Left arm, Patient Position: Sitting, BP Cuff Size: Adult)   Pulse 87   Temp 36.4 °C (97.5 °F) (Temporal)   Wt (!) 40.4 kg (89 lb)   BMI 15.77 kg/m²     Physical Exam  Constitutional:       Appearance: Normal appearance.   HENT:      Head: Normocephalic and atraumatic.   Eyes:      Extraocular Movements: Extraocular movements intact.      Pupils: Pupils are equal, round, and reactive to light.   Neck:      Thyroid: No thyroid mass or thyromegaly.      Vascular: No carotid bruit.   Cardiovascular:      Rate and Rhythm: Normal rate and regular rhythm.      Heart sounds: No murmur heard.     No friction rub. No gallop.   Pulmonary:      Effort: No respiratory distress.      Breath sounds: No wheezing, rhonchi or rales.   Musculoskeletal:      Cervical back: Neck supple.      Right lower leg: No edema.      Left lower leg: No edema.   Lymphadenopathy:      Cervical: No cervical adenopathy.   Neurological:      Mental Status: She is alert.         Assessment/Plan   Problem List Items Addressed This Visit             ICD-10-CM    Nausea in adult - Primary R11.0    Gastroesophageal reflux disease K21.9    Relevant Medications     esomeprazole (NexIUM) 40 mg DR capsule     Other Visit Diagnoses         Codes    Dysfunctional gallbladder     K82.8    Epigastric pain     R10.13    Relevant Medications    famotidine (Pepcid) 40 mg tablet

## 2023-12-21 ENCOUNTER — APPOINTMENT (OUTPATIENT)
Dept: PRIMARY CARE | Facility: CLINIC | Age: 63
End: 2023-12-21
Payer: COMMERCIAL

## 2024-01-04 ENCOUNTER — PROCEDURE VISIT (OUTPATIENT)
Dept: UROLOGY | Facility: CLINIC | Age: 64
End: 2024-01-04
Payer: COMMERCIAL

## 2024-01-04 VITALS
DIASTOLIC BLOOD PRESSURE: 108 MMHG | SYSTOLIC BLOOD PRESSURE: 179 MMHG | HEIGHT: 63 IN | BODY MASS INDEX: 15.84 KG/M2 | WEIGHT: 89.4 LBS | TEMPERATURE: 96.9 F

## 2024-01-04 DIAGNOSIS — C67.9 MALIGNANT NEOPLASM OF URINARY BLADDER, UNSPECIFIED SITE (MULTI): Primary | ICD-10-CM

## 2024-01-04 LAB
POC APPEARANCE, URINE: CLEAR
POC BILIRUBIN, URINE: NEGATIVE
POC BLOOD, URINE: ABNORMAL
POC COLOR, URINE: YELLOW
POC GLUCOSE, URINE: ABNORMAL MG/DL
POC KETONES, URINE: NEGATIVE MG/DL
POC LEUKOCYTES, URINE: NEGATIVE
POC NITRITE,URINE: NEGATIVE
POC PH, URINE: 6.5 PH
POC PROTEIN, URINE: ABNORMAL MG/DL
POC SPECIFIC GRAVITY, URINE: >=1.03
POC UROBILINOGEN, URINE: 0.2 EU/DL

## 2024-01-04 PROCEDURE — 88112 CYTOPATH CELL ENHANCE TECH: CPT | Performed by: PATHOLOGY

## 2024-01-04 PROCEDURE — 81002 URINALYSIS NONAUTO W/O SCOPE: CPT | Performed by: STUDENT IN AN ORGANIZED HEALTH CARE EDUCATION/TRAINING PROGRAM

## 2024-01-04 PROCEDURE — 88112 CYTOPATH CELL ENHANCE TECH: CPT

## 2024-01-04 PROCEDURE — 52000 CYSTOURETHROSCOPY: CPT | Performed by: STUDENT IN AN ORGANIZED HEALTH CARE EDUCATION/TRAINING PROGRAM

## 2024-01-04 NOTE — LETTER
January 4, 2024     Jim Schultz MD  [id number] [facility] [id number]    Patient: Olga Grajeda   YOB: 1960   Date of Visit: 1/4/2024       Dear Dr. Jim Schultz MD:    Thank you for referring Olga Grajeda to me for evaluation. Below are my notes for this consultation.  If you have questions, please do not hesitate to call me. I look forward to following your patient along with you.       Sincerely,     Idris Gutierrez MD      CC: No Recipients  ______________________________________________________________________________________    HPI:  Proc (9/19/23): TURBT   Path: non-invasive papillary urothelial carcinoma, low-grade     63 year old female self-referred for bladder mass. Hx of HTN. Patient is a smoker. Recently seen in the ED (8/8/23) for upper abdominal pain, hematuria, and dysuria. CT AP (8/9/23) showed no evidence of acute abnormality in the abdomen/pelvis, partially visualized emphysematous lung changes, there is a 1.2 cm nodularity in the right posterior dependent portion of the bladder without associated bladder wall thickening, which is nonspecific. S/p TURBT (9/19/23) with pathology showing non-invasive papillary urothelial carcinoma, low-grade. Presents for cystoscopy. Denies hematuria. Good urinary function.      Smoker   Hx: HTN  SHx: thyroidectomy (3/22)   FHx: father (cause of death) and brother had bladder cancer, both worked with chemicals.      CT AP (8/9/23): no evidence of acute abnormality in the abdomen/pelvis, partially visualized emphysematous lung changes, there is a 1.2 cm nodularity in the right posterior dependent portion of the bladder without associated bladder wall thickening, which is nonspecific.     Review of Systems:  All systems reviewed. Anything negative noted in the HPI.    Physical Exam:  Vitals signs reviewed.  Constitutional:      Appearance: Well-developed.  HENT:     Head: Normocephalic and atraumatic.  Neck:      Musculoskeletal: Normal range of motion.  Pulmonary:     Effort: Pulmonary effort is normal.  Musculoskeletal: Normal range of motion.  Skin:     General: Skin is warm and dry.  Neurological:     Mental Status: Alert and oriented to person, place, and time.  Psychiatric:        Behavior: Behavior normal.        Thought Content: Thought content normal.        Judgment: Judgment normal.    Cystoscopy    Date/Time: 1/4/2024 11:51 AM    Performed by: Idris Gutierrez MD  Authorized by: Idris Gutierrez MD    Procedure - Bladder Cystoscopy:     Procedure details: cystoscopy    Post-procedure:     Patient tolerance: Patient tolerated the procedure well with no immediate complications      Comments:      no mucosal lesions       Assessment/Plan  63 year old female self-referred for bladder mass. Hx of HTN. Patient is a smoker. Recently seen in the ED (8/8/23) for upper abdominal pain, hematuria, and dysuria. CT AP (8/9/23) showed no evidence of acute abnormality in the abdomen/pelvis, partially visualized emphysematous lung changes, there is a 1.2 cm nodularity in the right posterior dependent portion of the bladder without associated bladder wall thickening, which is nonspecific. S/p TURBT (9/19/23) with pathology showing non-invasive papillary urothelial carcinoma, low-grade. Denies hematuria. Good urinary function. Management options including risks, benefits and alternatives discussed at length and all questions answered. Patient prefers to proceed with follow-up in 3mos for cystoscopy.             By signing my name below, I, India Ledezma, attest that this documentation has been prepared under the direction and in the presence of Dr. Idris Gutierrez.  All medical record entries made by the Scribe were at my direction and personally dictated by me. I have reviewed the chart and agree that the record accurately reflects my personal performance of the history, physical exam, discussion and plan.

## 2024-01-04 NOTE — PROGRESS NOTES
HPI:  Proc (9/19/23): TURBT   Path: non-invasive papillary urothelial carcinoma, low-grade     63 year old female self-referred for bladder mass. Hx of HTN. Patient is a smoker. Recently seen in the ED (8/8/23) for upper abdominal pain, hematuria, and dysuria. CT AP (8/9/23) showed no evidence of acute abnormality in the abdomen/pelvis, partially visualized emphysematous lung changes, there is a 1.2 cm nodularity in the right posterior dependent portion of the bladder without associated bladder wall thickening, which is nonspecific. S/p TURBT (9/19/23) with pathology showing non-invasive papillary urothelial carcinoma, low-grade. Presents for cystoscopy. Denies hematuria. Good urinary function.      Smoker   Hx: HTN  SHx: thyroidectomy (3/22)   FHx: father (cause of death) and brother had bladder cancer, both worked with chemicals.      CT AP (8/9/23): no evidence of acute abnormality in the abdomen/pelvis, partially visualized emphysematous lung changes, there is a 1.2 cm nodularity in the right posterior dependent portion of the bladder without associated bladder wall thickening, which is nonspecific.     Review of Systems:  All systems reviewed. Anything negative noted in the HPI.    Physical Exam:  Vitals signs reviewed.  Constitutional:      Appearance: Well-developed.  HENT:     Head: Normocephalic and atraumatic.  Neck:     Musculoskeletal: Normal range of motion.  Pulmonary:     Effort: Pulmonary effort is normal.  Musculoskeletal: Normal range of motion.  Skin:     General: Skin is warm and dry.  Neurological:     Mental Status: Alert and oriented to person, place, and time.  Psychiatric:        Behavior: Behavior normal.        Thought Content: Thought content normal.        Judgment: Judgment normal.    Cystoscopy    Date/Time: 1/4/2024 11:51 AM    Performed by: Idris Gutierrez MD  Authorized by: Idris Gutierrez MD    Procedure - Bladder Cystoscopy:     Procedure details: cystoscopy    Post-procedure:      Patient tolerance: Patient tolerated the procedure well with no immediate complications      Comments:      no mucosal lesions       Assessment/Plan   63 year old female self-referred for bladder mass. Hx of HTN. Patient is a smoker. Recently seen in the ED (8/8/23) for upper abdominal pain, hematuria, and dysuria. CT AP (8/9/23) showed no evidence of acute abnormality in the abdomen/pelvis, partially visualized emphysematous lung changes, there is a 1.2 cm nodularity in the right posterior dependent portion of the bladder without associated bladder wall thickening, which is nonspecific. S/p TURBT (9/19/23) with pathology showing non-invasive papillary urothelial carcinoma, low-grade. Denies hematuria. Good urinary function. Management options including risks, benefits and alternatives discussed at length and all questions answered. Patient prefers to proceed with follow-up in 3mos for cystoscopy.             By signing my name below, I, India Ledezma, attest that this documentation has been prepared under the direction and in the presence of Dr. Idris Gutierrez.  All medical record entries made by the Scribe were at my direction and personally dictated by me. I have reviewed the chart and agree that the record accurately reflects my personal performance of the history, physical exam, discussion and plan.

## 2024-01-09 LAB
LABORATORY COMMENT REPORT: NORMAL
LABORATORY COMMENT REPORT: NORMAL
PATH REPORT.FINAL DX SPEC: NORMAL
PATH REPORT.GROSS SPEC: NORMAL
PATH REPORT.RELEVANT HX SPEC: NORMAL
PATH REPORT.TOTAL CANCER: NORMAL

## 2024-01-18 ENCOUNTER — TELEMEDICINE (OUTPATIENT)
Dept: PRIMARY CARE | Facility: CLINIC | Age: 64
End: 2024-01-18
Payer: COMMERCIAL

## 2024-01-18 DIAGNOSIS — K82.8 DYSFUNCTIONAL GALLBLADDER: ICD-10-CM

## 2024-01-18 DIAGNOSIS — R10.13 EPIGASTRIC PAIN: ICD-10-CM

## 2024-01-18 DIAGNOSIS — R10.11 RUQ PAIN: ICD-10-CM

## 2024-01-18 DIAGNOSIS — R11.0 NAUSEA IN ADULT: Primary | ICD-10-CM

## 2024-01-18 PROCEDURE — 99442 PR PHYS/QHP TELEPHONE EVALUATION 11-20 MIN: CPT | Performed by: INTERNAL MEDICINE

## 2024-01-18 ASSESSMENT — ENCOUNTER SYMPTOMS
NAUSEA: 1
BELCHING: 1
ABDOMINAL PAIN: 1

## 2024-01-18 NOTE — PROGRESS NOTES
"Subjective   Patient ID: Olga Grajeda is a 63 y.o. female who presents for No chief complaint on file..    Prior visit:  Prior evaluation: \"Bilateral upper abdomen pain, onset mid may. Progressing since mid may pain escalated to the point she made a trip to the emergency room with a detailed evaluation that included negative cardiac.  Examination normal liver and pancreatic enzymes and a negative CT abdomen.  She was found to be positive for UTI and treated by Macrobid.  She has been having epigastric pain, nausea recurrently without symptoms of diarrhea constipation blood in the stool vomiting.  She has a big gastric discomfort and reports symptoms of bloating.  There is no radiation to the chest jaw or back.  She does smoke.  On examination today the area of greatest pain with a little involuntary guarding is the right upper quadrant over the gallbladder.\"   Since previous encounters the patient was intolerant of 2 of the 3 previous acid medications back in August due to developed drug rash.  She has remained on the Pepcid and does note there are benefits to taking it in reducing acid reflux and her overall symptom pattern.  Her biggest ongoing issues continue to be postprandial nausea and epigastric pain primarily right-sided but radiating to the left side.  It is most commonly experience with dinner.  She also notes that she has had to reduce to a much more bland diet as a result of these persistent symptoms.  Last night she ate sour cream and had stewart and today she feels more nauseous in general as a result.  Her HIDA scan was borderline at 37%.  Her upper endoscopy completed recently, although records were unavailable at today's visit for me to review and incorporate but were reported by the patient, was supposedly unremarkable with a negative celiac test, negative H. pylori test, and no reports of ulcer however I do not have the details of the report in full.     I advised the patient for second trial " "of an alternative PPI reevaluation in 6 weeks and if not making progress we would need to consider alternatives to the gallbladder with a general surgeon for possible gallbladder dyskinesia which the patient was borderline for on her HIDA scan.   Pain  Associated symptoms include abdominal pain and nausea. Pertinent negatives include no chest pain, constipation, diarrhea, fever, shortness of breath or vomiting. \"    Eval by telephone virtual visit for medical follow up on GI issues, Pt consented to visit in this manner.   Today:  Symptoms improved mildly since last visit, but are still present and ongoing despite aggressive therapy.  She has an Endoscopy on 11/2023. Hiatal hernia. Otherwise unremarkable.  A gallbladder HIDA scan that showed no stones but borderline gallbladder pumping function just below the normal threshold.  She has persistent right upper quadrant pain associated with nausea food intolerance, especially for greasy or fatty foods that will provoke nausea and abdominal pain.  As a consequence of this there has been weight loss and intolerance of a lot of foods and meals.  She is limited to bleeding by upper abdominal epigastric and right upper quadrant cramping style abdominal pain and recurring bouts of nausea.  At this time she has had a detailed workup that has been negative aside from gallbladder dysfunction and is being advised to follow-up with general surgery for evaluation of dysfunctional gallbladder and possible elective cholecystectomy.    Visit time 13 mins    Abdominal Pain  This is a recurrent problem. The current episode started more than 1 month ago. The onset quality is gradual. The problem occurs daily. The problem has been waxing and waning. The pain is located in the epigastric region. The pain is at a severity of 5/10. The quality of the pain is cramping. The abdominal pain radiates to the RUQ. Associated symptoms include belching and nausea. Prior diagnostic workup includes CT " scan and upper endoscopy.        Review of Systems   Gastrointestinal:  Positive for abdominal pain and nausea.       Objective   There were no vitals taken for this visit.    Physical Exam    Assessment/Plan   Problem List Items Addressed This Visit             ICD-10-CM    Nausea in adult - Primary R11.0    Relevant Orders    Referral to General Surgery     Other Visit Diagnoses         Codes    Dysfunctional gallbladder     K82.8    Relevant Orders    Referral to General Surgery    Epigastric pain     R10.13    Relevant Orders    Referral to General Surgery    RUQ pain     R10.11    Relevant Orders    Referral to General Surgery

## 2024-01-29 ENCOUNTER — OFFICE VISIT (OUTPATIENT)
Dept: SURGERY | Facility: CLINIC | Age: 64
End: 2024-01-29
Payer: COMMERCIAL

## 2024-01-29 ENCOUNTER — HOSPITAL ENCOUNTER (OUTPATIENT)
Facility: HOSPITAL | Age: 64
Setting detail: OUTPATIENT SURGERY
End: 2024-01-29
Attending: SURGERY | Admitting: SURGERY
Payer: COMMERCIAL

## 2024-01-29 VITALS
TEMPERATURE: 98.3 F | OXYGEN SATURATION: 99 % | WEIGHT: 87 LBS | HEART RATE: 89 BPM | HEIGHT: 64 IN | BODY MASS INDEX: 14.85 KG/M2

## 2024-01-29 DIAGNOSIS — R10.11 RUQ PAIN: ICD-10-CM

## 2024-01-29 DIAGNOSIS — R63.6 SEVERELY UNDERWEIGHT ADULT: Primary | ICD-10-CM

## 2024-01-29 DIAGNOSIS — K82.8 DYSFUNCTIONAL GALLBLADDER: ICD-10-CM

## 2024-01-29 DIAGNOSIS — K82.8 BILIARY DYSKINESIA: ICD-10-CM

## 2024-01-29 DIAGNOSIS — R11.0 NAUSEA IN ADULT: ICD-10-CM

## 2024-01-29 DIAGNOSIS — R10.13 EPIGASTRIC PAIN: ICD-10-CM

## 2024-01-29 PROCEDURE — 99214 OFFICE O/P EST MOD 30 MIN: CPT | Performed by: SURGERY

## 2024-01-29 PROCEDURE — 99204 OFFICE O/P NEW MOD 45 MIN: CPT | Performed by: SURGERY

## 2024-01-29 RX ORDER — ONDANSETRON 4 MG/1
4 TABLET, ORALLY DISINTEGRATING ORAL ONCE
Status: CANCELLED | OUTPATIENT
Start: 2024-01-29 | End: 2024-01-29

## 2024-01-29 RX ORDER — ACETAMINOPHEN 325 MG/1
975 TABLET ORAL ONCE
Status: CANCELLED | OUTPATIENT
Start: 2024-01-29 | End: 2024-01-29

## 2024-01-29 RX ORDER — SCOLOPAMINE TRANSDERMAL SYSTEM 1 MG/1
1 PATCH, EXTENDED RELEASE TRANSDERMAL
Status: CANCELLED | OUTPATIENT
Start: 2024-01-29 | End: 2024-02-01

## 2024-01-29 RX ORDER — PREGABALIN 75 MG/1
75 CAPSULE ORAL ONCE
Status: CANCELLED | OUTPATIENT
Start: 2024-01-29 | End: 2024-01-29

## 2024-01-29 RX ORDER — CELECOXIB 50 MG/1
200 CAPSULE ORAL ONCE
Status: CANCELLED | OUTPATIENT
Start: 2024-01-29 | End: 2024-01-29

## 2024-01-29 RX ORDER — SODIUM CHLORIDE, SODIUM LACTATE, POTASSIUM CHLORIDE, CALCIUM CHLORIDE 600; 310; 30; 20 MG/100ML; MG/100ML; MG/100ML; MG/100ML
100 INJECTION, SOLUTION INTRAVENOUS CONTINUOUS
Status: CANCELLED | OUTPATIENT
Start: 2024-01-29

## 2024-01-29 RX ORDER — HEPARIN SODIUM 5000 [USP'U]/ML
5000 INJECTION, SOLUTION INTRAVENOUS; SUBCUTANEOUS ONCE
Status: CANCELLED | OUTPATIENT
Start: 2024-01-29 | End: 2024-01-29

## 2024-01-29 ASSESSMENT — PATIENT HEALTH QUESTIONNAIRE - PHQ9
1. LITTLE INTEREST OR PLEASURE IN DOING THINGS: NOT AT ALL
2. FEELING DOWN, DEPRESSED OR HOPELESS: NOT AT ALL
SUM OF ALL RESPONSES TO PHQ9 QUESTIONS 1 AND 2: 0

## 2024-01-29 ASSESSMENT — ENCOUNTER SYMPTOMS
NERVOUS/ANXIOUS: 0
WOUND: 0
VOMITING: 0
ABDOMINAL PAIN: 1
SORE THROAT: 0
SHORTNESS OF BREATH: 0
CHILLS: 0
ABDOMINAL DISTENTION: 0
FACIAL SWELLING: 0
BRUISES/BLEEDS EASILY: 0
FEVER: 0
DIARRHEA: 0
NAUSEA: 1
NECK PAIN: 0
EYE REDNESS: 0
ADENOPATHY: 0
TREMORS: 1
DIFFICULTY URINATING: 0
COLOR CHANGE: 0
CHEST TIGHTNESS: 0
CONFUSION: 0
CONSTIPATION: 0
LIGHT-HEADEDNESS: 0
BLOOD IN STOOL: 0
SPEECH DIFFICULTY: 0
WEAKNESS: 0
BACK PAIN: 0
TROUBLE SWALLOWING: 0

## 2024-01-29 ASSESSMENT — PAIN SCALES - GENERAL: PAINLEVEL: 6

## 2024-01-29 NOTE — ASSESSMENT & PLAN NOTE
The procedure of laparoscopic cholecystectomy was discussed with the patient, including the traditional four-port laparoscopic approach with clipping of the cystic duct and artery, and removal of the gallbladder through the umbilicus.    Risks, benefits, and alternatives laparoscopic cholecystectomy with intraoperative cholangiography were described. Risks including but not limited to bleeding, infection, bile leak or retained stone, need for endoscopic retrograde cholangiopancreatography, common duct injury, need for secondary procedures and related procedures. We discussed the risks of bile leak, retained stone, and potential need for ERCP to be less than 2%. Patient understands risks and agrees to proceed with robotic assisted laparoscopic cholecystectomy with cholangiography

## 2024-01-29 NOTE — PATIENT INSTRUCTIONS
Thank you for scheduling surgery with Dr. Lugo. Below you will find your Surgery Itinerary to include dates, times, and locations for appointments involved with your procedure.    You will be scheduled for two (2) separate Pre Admission Testing appointments. The first being a Screening Appointment through a phone call to determine the necessity of an in-person Pre Admission Testing appointment. If deemed unnecessary, the in-person appointment will be cancelled. The appointments are as followed:      Pre Admission Testing Screening:     Pre-Admission Testing at: [facility] - [address] [place] [address]  (A representative from [facility] will contact you directly to schedule all Pre Admission Testing appointments)    On the day before the scheduled surgery, please call the Same Day Surgery department between 2-4 pm for a time of arrival for the day of procedure.  [facility] [phone]    Nothing to eat or drink after midnight the night before surgery    Surgery with Dr. Lugo at: [facility] - [address] [place] [address]  On Friday February 9th, 2024    *Please note, you may receive a call from our financial counselors if you have a financial liability greater than $250.       [facility]  Pre - Operative Instructions     Your time of arrival for surgery is available the day before surgery. *If the surgery is on a Monday, or the Tuesday following a Monday Holiday, please call Same Day Surgery the Friday before your surgery date.*  DO NOT EAT OR DRINK ANYTHING AFTER MIDNIGHT THE NIGHT BEFORE SURGERY. This includes any beverages (coffee, water, soda, etc.), hard candy, gum or mints. If this is not followed, surgery may be canceled. Please avoid eating a large meal the evening before surgery. Please do not DRINK ALCOHOL or SMOKE FOR 24 HOURS before surgery.   Insulin Instructions - Please do not take any short acting Insulin  (Regular or NPH). Do not take any oral diabetic medication on the day of surgery. Long acting Insulins may be taken (Lantus). We will check your blood sugar and administer at the hospital the day of surgery. Patient who have Insulin pumps are to make NO adjustments.   Prescription Medications - You are encouraged to take prescription medications including heart, blood pressure, anti-seizure, anxiety, breathing medications (including inhalers) with exception to diabetic medications prior to arriving at the hospital the day of surgery. You may take prescribed pain medications as needed. Please remember the dose and time taken so we may inform your Anesthesiologist.   Please bring the name, dosage, and frequency of your medications if you did not provide these on the day of Pre Admission Testing. You may bring the actual bottles if this would be easier for you.   Please bring your prescribed inhalers with you the morning of surgery.   Patients on Anti-platelet and Anticoagulant agents, please read the following:  ASA, NSAIDS stop 5 days prior to surgery  Coumadin stop 5 days prior to surgery unless bridging therapy is needed (metal valve replacement, cardiac stent placement) - if so please speak to your Cardiologist or prescribing physician.   Other anti-platelet and anticoagulant agents:  Plavix (Clopidogrel) stop 5 days prior to surgery  Brilinta (Ticagrelor) stop 5 days prior to surgery   Effient (Prasugrel) stop 7 days prior to surgery   Lovenox (Enoxaparin) stop 24 hours prior to surgery  Arixtra (Fondaparinux) stop 5 days prior to surgery  Xarelto (Rivaroxaban) stop 3 days prior to surgery  Pradaxa (Dabigatran) stop 5 days prior to surgery  Eliquis (Apixaban) stop 3 days prior to surgery   Savaysa (Endoxaban) stop days prior to surgery     Please stop all herbal medications 2 weeks prior to surgery   C-PAP Devices - If you have a C-PAP device at home, bring it with you on our day of surgery if your surgery  requires you to stay overnight.   Please bring a copy of any Advanced Directives the day of surgery if you did not provide it at Pre Admission Testing. These documents are living son and durable power of  for healthcare.   Please notify your physician/surgeon if you develop a cold, sore throat, fever, flu symptoms, COVID symptoms or any changes in your physical conditions.   A shower or bath is preferred the evening before or the morning of surgery.   Remove jewelry before admission to the hospital. It is no longer permitted to tape rings. We ask that you leave all valuables at home. Any items of value will be given to a family member or locked up by security.   If you have a body piercing g that you cannot remove, it is recommended that you have it removed professionally and have a plastic spacer inserted. There is a risk for surgical burns with jewelry left in place.   Remove or wear minimal makeup the day of surgery. You will be asked to remove glasses and contact lenses prior to surgery. Please bring a glass case and/or contact lens case with you. These items are not provided.   Dentures and partials are usually removed prior to surgery. A denture cup will be provided for you.   You may be asked to remove nail polish. Acrylic nails are now acceptable.   Wear loose comfortable clothing that you will be able to fit over bandages when you leave the hospitals (as appropriate for your surgery).  Patients that are under the age of 18 years must have a parent or guardian present the day of surgery.   Family members of significant others may stay with you on the Same Day Surgery unit. While you are in surgery, they may wait for you in the family waiting area. The physician will speak to the waiting family, if permitted by the patient, after surgery.   Changes or delays in the surgery schedule may occur due to emergencies. The hospital will notify you if this occurs. We apologize for any inconveniences this may  present.   You must have a responsible  available to drive you home after surgery. You will not be permitted to drive yourself home after surgery if you have received any anesthesia or sedation during your procedure.   Please visit our website at [email or web address] for more information regarding [facility] services.    For questions about your Pre Admission Testing (PAT)  [facility] [phone]  [facility] [phone]      Thank you for choosing [facility]!

## 2024-01-29 NOTE — ASSESSMENT & PLAN NOTE
Recommend that patient try to find plant-based protein shakes as the dairy base protein shakes increase her symptoms.  Explained to her she is increased risk of postoperative complications by being underweight, especially wound healing.   well appearing and VSS 4 will plan to dchome.

## 2024-01-29 NOTE — PROGRESS NOTES
"General Surgery Service    History Of Present Illness    Olga Grajeda is a 63 y.o. female presenting with abdominal pain with right upper quadrant pain after eating.  She noticed severe symptoms after having Italian sausage.  She takes Nexium and her symptoms are unchanged.  She had an EGD in the past which she states was unremarkable.  She has been refusing colonoscopy for several years due to concern \"they might find something\".  She had a CT scan which showed a bladder tumor and had a transurethral bladder tumor resection.    Subsequent HIDA scan showed diminished ejection fraction and reproduction of her symptoms with administration of a protein shake.    She states she has been underweight her whole life, was diagnosed with hyperthyroidism was treated with meth amaze all and then surgical resection and her weight consistently has been under 100 pounds, but has been as low as 70 pounds during her last thyroid storm.  Her most recent free thyroxine and TSH levels are normal.     Past Medical History  She has a past medical history of Bladder cancer (CMS/HCC) (2023), Disorder of thyroid, unspecified (11/03/2021), and Thyroid nodule.    Current Outpatient Medications on File Prior to Visit   Medication Sig Dispense Refill    amLODIPine (Norvasc) 5 mg tablet Take 1 tablet (5 mg) by mouth once daily. 90 tablet 1    calcium 500 mg calcium (1,250 mg) tablet Take 2 tablets (2,500 mg) by mouth 3 times a day.      cholecalciferol (Vitamin D-3) 50 mcg (2,000 unit) capsule Take 1 capsule (50 mcg) by mouth once daily.      docusate sodium 250 mg capsule Take 1 capsule (250 mg) by mouth once daily. 30 capsule 0    esomeprazole (NexIUM) 40 mg DR capsule Take 1 capsule (40 mg) by mouth once daily in the morning. Take before meals. Do not open capsule. 90 capsule 0    famotidine (Pepcid) 40 mg tablet Take 1 tablet (40 mg) by mouth 2 times a day. 90 tablet 1    fluticasone-umeclidin-vilanter (Trelegy Ellipta) 200-62.5-25 " mcg blister with device Inhale 1 puff once daily. 1 each 0    losartan (Cozaar) 50 mg tablet Take 1 tablet (50 mg) by mouth 2 times a day. 180 tablet 3    ondansetron ODT (Zofran-ODT) 4 mg disintegrating tablet Take 1 tablet (4 mg) by mouth every 8 hours if needed for nausea.      sennosides (senna) 8.6 mg capsule Take 1 capsule (8.6 mg) by mouth as needed at bedtime (constipation). 10 capsule 0    levothyroxine (Synthroid, Levoxyl) 75 mcg tablet Take 1 tablet (75 mcg) by mouth once daily. 30 tablet 1     No current facility-administered medications on file prior to visit.         Surgical History  She has a past surgical history that includes Total thyroidectomy (03/2022) and Bladder tumor excision (2023).     Social History  She reports that she has been smoking cigarettes. She has a 7.50 pack-year smoking history. She has never used smokeless tobacco. She reports that she does not drink alcohol and does not use drugs.    Family History  Family History   Problem Relation Name Age of Onset    No Known Problems Mother      Cancer Father          bladder    Cancer Brother          bladder        Allergies  Propranolol, Pantoprazole, and Sucralfate    Review of Systems   Constitutional:  Negative for chills and fever.   HENT:  Negative for facial swelling, sore throat and trouble swallowing.    Eyes:  Negative for redness and visual disturbance.   Respiratory:  Negative for chest tightness and shortness of breath.    Cardiovascular:  Negative for chest pain and leg swelling.   Gastrointestinal:  Positive for abdominal pain and nausea. Negative for abdominal distention, blood in stool, constipation, diarrhea and vomiting.   Endocrine: Negative for cold intolerance and heat intolerance.   Genitourinary:  Negative for difficulty urinating and enuresis.   Musculoskeletal:  Negative for back pain, gait problem and neck pain.   Skin:  Negative for color change, rash and wound.   Allergic/Immunologic: Negative for  "immunocompromised state.   Neurological:  Positive for tremors. Negative for speech difficulty, weakness and light-headedness.   Hematological:  Negative for adenopathy. Does not bruise/bleed easily.   Psychiatric/Behavioral:  Negative for confusion. The patient is not nervous/anxious.         Physical Exam  Constitutional:       General: She is not in acute distress.     Appearance: She is not toxic-appearing.   HENT:      Head: Normocephalic and atraumatic.      Mouth/Throat:      Mouth: Mucous membranes are moist.      Pharynx: Oropharynx is clear.   Eyes:      General: No scleral icterus.     Extraocular Movements: Extraocular movements intact.      Pupils: Pupils are equal, round, and reactive to light.   Cardiovascular:      Rate and Rhythm: Normal rate and regular rhythm.   Pulmonary:      Effort: Pulmonary effort is normal.      Breath sounds: Normal breath sounds.   Abdominal:      General: There is no distension.      Palpations: Abdomen is soft. There is no mass.      Tenderness: There is no abdominal tenderness. There is no guarding.      Hernia: No hernia is present.   Musculoskeletal:         General: No swelling. Normal range of motion.      Cervical back: Normal range of motion.   Skin:     General: Skin is warm and dry.      Coloration: Skin is not jaundiced.   Neurological:      General: No focal deficit present.      Mental Status: She is alert and oriented to person, place, and time.   Psychiatric:         Mood and Affect: Mood normal.         Behavior: Behavior normal.          Last Recorded Vitals  Pulse 89   Temp 36.8 °C (98.3 °F)   Wt (!) 39.5 kg (87 lb)   SpO2 99%     Relevant Results      No results found for this or any previous visit (from the past 24 hour(s)).   No results found for: \"HGBA1C\"   No results found.    Active Problems:  There are no active Hospital Problems.     Assessment/Plan   Problem List Items Addressed This Visit             ICD-10-CM    Nausea in adult R11.0    " Biliary dyskinesia K82.8     The procedure of laparoscopic cholecystectomy was discussed with the patient, including the traditional four-port laparoscopic approach with clipping of the cystic duct and artery, and removal of the gallbladder through the umbilicus.    Risks, benefits, and alternatives laparoscopic cholecystectomy with intraoperative cholangiography were described. Risks including but not limited to bleeding, infection, bile leak or retained stone, need for endoscopic retrograde cholangiopancreatography, common duct injury, need for secondary procedures and related procedures. We discussed the risks of bile leak, retained stone, and potential need for ERCP to be less than 2%. Patient understands risks and agrees to proceed with robotic assisted laparoscopic cholecystectomy with cholangiography         Severely underweight adult - Primary R63.6     Recommend that patient try to find plant-based protein shakes as the dairy base protein shakes increase her symptoms.  Explained to her she is increased risk of postoperative complications by being underweight, especially wound healing.          Other Visit Diagnoses         Codes    Dysfunctional gallbladder     K82.8    Epigastric pain     R10.13    RUQ pain     R10.11                   Juan Lugo MD

## 2024-02-02 ENCOUNTER — TELEMEDICINE CLINICAL SUPPORT (OUTPATIENT)
Dept: PREADMISSION TESTING | Facility: HOSPITAL | Age: 64
End: 2024-02-02
Payer: COMMERCIAL

## 2024-02-02 NOTE — PREPROCEDURE INSTRUCTIONS
Medication List            Accurate as of February 2, 2024  3:40 PM. Always use your most recent med list.                amLODIPine 5 mg tablet  TAKE MORNING OF SURGERY  Commonly known as: Norvasc  Take 1 tablet (5 mg) by mouth once daily.     calcium carbonate 500 mg calcium (1,250 mg) tablet  Commonly known as: Oscal    NO LONGER TAKING     cholecalciferol 50 mcg (2,000 unit) capsule  Commonly known as: Vitamin D-3   NO LONGER TAKING     docusate sodium 250 mg capsule  Take 1 capsule (250 mg) by mouth once daily.  NO LONGER TAKING     esomeprazole 40 mg DR capsule  Commonly known as: NexIUM  Take 1 capsule (40 mg) by mouth once daily in the morning. Take before meals. Do not open capsule.     TAKE NIGHT BEFORE SURGERY     famotidine 40 mg tablet  Commonly known as: Pepcid  Take 1 tablet (40 mg) by mouth 2 times a day.  TAKE MORNING OF SURGERY     levothyroxine 75 mcg tablet                      TAKE MORNING OF SURGERY  Commonly known as: Synthroid, Levoxyl  Take 1 tablet (75 mcg) by mouth once daily.     losartan 50 mg tablet                             STOP 1 DAY BEFORE SURGERY  Commonly known as: Cozaar  Take 1 tablet (50 mg) by mouth 2 times a day.     ondansetron ODT 4 mg disintegrating tablet  Commonly known as: Zofran-ODT     senna 8.6 mg capsule  Generic drug: sennosides  Take 1 capsule (8.6 mg) by mouth as needed at bedtime (constipation).     Trelegy Ellipta 200-62.5-25 mcg blister with device  Generic drug: fluticasone-umeclidin-vilanter                 NO LONGER TAKING  Inhale 1 puff once daily.                              NPO Instructions:    Do not eat any food after midnight the night before your surgery/procedure.    Additional Instructions:     Day of Surgery:  Review your medication instructions, take indicated medications  Wear  comfortable loose fitting clothing  Do not use moisturizers, creams, lotions or perfume  All jewelry and valuables should be left at home  PAT DISCHARGE  INSTRUCTIONS    Please call the Same Day Surgery (SDS) Department of the hospital where your procedure will be performed after 2:00 PM the day before your surgery. If you are scheduled on a Monday, or a Tuesday following a Monday holiday, you will need to call on the last business day prior to your surgery.    [facility]  [id number] [place] [address], [id number] [phone]    Please let your surgeon know if:      You develop any open sores, shingles, burning or painful urination as these may increase your risk of an infection.   You no longer wish to have the surgery.   Any other personal circumstances change that may lead to the need to cancel or defer this surgery-such as being sick or getting admitted to any hospital within one week of your planned procedure.    Your contact details change, such as a change of address or phone number.    Starting now:     Please DO NOT drink alcohol or smoke for 24 hours before surgery. It is well known that quitting smoking can make a huge difference to your health and recovery from surgery. The longer you abstain from smoking, the better your chances of a healthy recovery. If you need help with quitting, call 2-800QUIT-NOW to be connected to a trained counselor who will discuss the best methods to help you quit.     Before your surgery:    Please stop all supplements 7 days prior to surgery. Or as directed by your surgeon.   Please stop taking NSAID pain medicine such as Advil and Motrin 7 days before surgery.    If you develop any fever, cough, cold, rashes, cuts, scratches, scrapes, urinary symptoms or infection anywhere on your body (including teeth and gums) prior to surgery, please call your surgeon’s office as soon as possible. This may require treatment to reduce the chance of cancellation on the day of surgery.    The day before your surgery:   Get a good night’s rest.  Use the special soap for bathing if you have been instructed  to use one.    Scheduled surgery times may change and you will be notified if this occurs - please check your personal voicemail for any updates.     On the morning of surgery:   Wear comfortable, loose fitting clothes which open in the front. Please do not wear moisturizers, creams, lotions, makeup or perfume.    Please bring with you to surgery:   Photo ID and insurance card   Current list of medicines and allergies   Pacemaker/ Defibrillator/Heart stent cards   CPAP machine and mask    Slings/ splints/ crutches   A copy of your complete advanced directive/DHPOA.    Please do NOT bring with you to surgery:   All jewelry and valuables should be left at home.   Prosthetic devices such as contact lenses, hearing aids, dentures, eyelash extensions, hairpins and body piercings must be removed prior to going in to the surgical suite.    After outpatient surgery:   A responsible adult MUST accompany you at the time of discharge and stay with you for 24 hours after your surgery. You may NOT drive yourself home after surgery.    Do not drive, operate machinery, make critical decisions or do activities that require co-ordination or balance until after a night’s sleep.   Do not drink alcoholic beverages for 24 hours.   Instructions for resuming your medications will be provided by your surgeon.    CALL YOUR DOCTOR AFTER SURGERY IF YOU HAVE:     Chills and/or a fever of 101° F or higher.    Redness, swelling, pus or drainage from your surgical wound or a bad smell from the wound.    Lightheadedness, fainting or confusion.    Persistent vomiting (throwing up) and are not able to eat or drink for 12 hours.    Three or more loose, watery bowel movements in 24 hours (diarrhea).   Difficulty or pain while urinating( after non-urological surgery)    Pain and swelling in your legs, especially if it is only on one side.    Difficulty breathing or are breathing faster than normal.    Any new concerning  symptoms.

## 2024-02-05 ENCOUNTER — APPOINTMENT (OUTPATIENT)
Dept: PREADMISSION TESTING | Facility: HOSPITAL | Age: 64
End: 2024-02-05
Payer: COMMERCIAL

## 2024-02-07 ENCOUNTER — TELEPHONE (OUTPATIENT)
Dept: SURGERY | Facility: CLINIC | Age: 64
End: 2024-02-07
Payer: COMMERCIAL

## 2024-02-07 ENCOUNTER — PRE-ADMISSION TESTING (OUTPATIENT)
Dept: PREADMISSION TESTING | Facility: HOSPITAL | Age: 64
End: 2024-02-07
Payer: COMMERCIAL

## 2024-02-07 VITALS
TEMPERATURE: 98.6 F | OXYGEN SATURATION: 97 % | HEART RATE: 89 BPM | DIASTOLIC BLOOD PRESSURE: 80 MMHG | WEIGHT: 88.85 LBS | HEIGHT: 63 IN | SYSTOLIC BLOOD PRESSURE: 152 MMHG | BODY MASS INDEX: 15.74 KG/M2

## 2024-02-07 DIAGNOSIS — R63.6 SEVERELY UNDERWEIGHT ADULT: ICD-10-CM

## 2024-02-07 DIAGNOSIS — K82.8 BILIARY DYSKINESIA: ICD-10-CM

## 2024-02-07 PROCEDURE — 99203 OFFICE O/P NEW LOW 30 MIN: CPT | Performed by: PHYSICIAN ASSISTANT

## 2024-02-07 PROCEDURE — 93005 ELECTROCARDIOGRAM TRACING: CPT

## 2024-02-07 ASSESSMENT — CHADS2 SCORE
HYPERTENSION: YES
PRIOR STROKE OR TIA OR THROMBOEMBOLISM: NO
AGE GREATER THAN OR EQUAL TO 75: NO
CHF: NO
CHADS2 SCORE: 1
DIABETES: NO

## 2024-02-07 ASSESSMENT — LIFESTYLE VARIABLES: SMOKING_STATUS: SMOKER

## 2024-02-07 ASSESSMENT — ENCOUNTER SYMPTOMS: TREMORS: 1

## 2024-02-07 NOTE — CPM/PAT H&P
CPM/PAT Evaluation       Name: Olga Grajeda (Olga Grajeda)  /Age: 1960/63 y.o.     In-Person       Chief Complaint: Abdominal pain    HPI 63-year-old female complains of right upper quadrant pain with radiation to shoulder.  Patient states she has been having abdominal pain with nausea since May 2023.  In August patient underwent CT scan which did not demonstrate gallstones but did note bladder mass.  Patient underwent HIDA scan with ejection fraction slightly below normal at 37%.  Patient underwent bladder tumor removal in 2023.  States she does not feel well today.  EKG August demonstrates anteroseptal infarct repeat EKG today demonstrates septal infarct.  Patient denies history of MI.  She will need cardiac clearance prior to procedure that was scheduled 2024    Past Medical History:   Diagnosis Date    Bladder cancer (CMS/HCC)     Disease of thyroid gland     Disorder of thyroid, unspecified 2021    Thyroid mass    Hypertension     Thyroid nodule        Past Surgical History:   Procedure Laterality Date    BLADDER TUMOR EXCISION      THYROID SURGERY      TOTAL THYROIDECTOMY  2022       Patient  reports being sexually active and has had partner(s) who are male.    Family History   Problem Relation Name Age of Onset    No Known Problems Mother      Cancer Father lyndsay         bladder    Cancer Brother          bladder       Allergies   Allergen Reactions    Propranolol Hives and Unknown    Pantoprazole Rash    Sucralfate Rash       Current Outpatient Medications   Medication Instructions    amLODIPine (NORVASC) 5 mg, oral, Daily    calcium 500 mg calcium (1,250 mg) tablet 2 tablets, oral, 3 times daily    cholecalciferol (Vitamin D-3) 50 mcg (2,000 unit) capsule 1 capsule, oral, Daily    docusate sodium 250 mg, oral, Daily    esomeprazole (NEXIUM) 40 mg, oral, Daily before breakfast, Do not open capsule.    famotidine (PEPCID) 40 mg, oral, 2 times daily     "fluticasone-umeclidin-vilanter (Trelegy Ellipta) 200-62.5-25 mcg blister with device 1 puff, inhalation, Daily    levothyroxine (SYNTHROID, LEVOXYL) 75 mcg, oral, Daily    losartan (COZAAR) 50 mg, oral, 2 times daily    ondansetron ODT (ZOFRAN-ODT) 4 mg, oral, Every 8 hours PRN    senna 8.6 mg, oral, Nightly PRN     Review of Systems   Constitutional:         Very underweight   HENT:          Wears glasses   Neurological:  Positive for tremors.   All other systems reviewed and are negative.       Physical Exam  Vitals reviewed. Physical exam within normal limits.   Constitutional:       Appearance: She is ill-appearing.   HENT:      Head: Normocephalic and atraumatic.   Neck:      Vascular: No carotid bruit.   Cardiovascular:      Rate and Rhythm: Normal rate and regular rhythm.      Pulses: Normal pulses.      Heart sounds: Normal heart sounds.   Pulmonary:      Effort: Pulmonary effort is normal.      Breath sounds: Normal breath sounds.   Abdominal:      Tenderness: There is abdominal tenderness.   Musculoskeletal:         General: Normal range of motion.   Lymphadenopathy:      Cervical: No cervical adenopathy.   Skin:     General: Skin is warm and dry.   Neurological:      General: No focal deficit present.      Mental Status: She is alert and oriented to person, place, and time.      Comments: Tremors   Psychiatric:         Mood and Affect: Mood normal.         Behavior: Behavior normal.         Thought Content: Thought content normal.         Judgment: Judgment normal.          PAT AIRWAY:   Airway:     Mallampati::  IV   upper dentures and lower dentures      Vitals  Heart Rate:  [89]   Temp:  [37 °C (98.6 °F)]   BP: (152)/(80)   Height:  [160 cm (5' 3\")]   Weight:  [40.3 kg (88 lb 13.5 oz)]   SpO2:  [97 %]        DASI Risk Score    No data to display       Caprini DVT Assessment      Flowsheet Row Most Recent Value   DVT Score 10   Current Status COPD, Major surgery planned, lasting 2-3 hours   History " Prior major surgery, Previous malignancy   Age 60-75 years   BMI 30 or less          Modified Frailty Index    No data to display       CHADS2 Stroke Risk  Current as of 21 minutes ago        N/A 3 - 100%: High Risk   2 - 3%: Medium Risk   0 - 2%: Low Risk     Last Change: N/A          This score determines the patient's risk of having a stroke if the patient has atrial fibrillation.        This score is not applicable to this patient. Components are not calculated.          Revised Cardiac Risk Index      Flowsheet Row Most Recent Value   Revised Cardiac Risk Calculator 1          Apfel Simplified Score      Flowsheet Row Most Recent Value   Apfel Simplified Score Calculator 1          Risk Analysis Index Results This Encounter    No data found in the last 1 encounters.       Stop Bang Score      Flowsheet Row Most Recent Value   Do you snore loudly? 0   Do you often feel tired or fatigued after your sleep? 0   Has anyone ever observed you stop breathing in your sleep? 0   Do you have or are you being treated for high blood pressure? 1   Recent BMI (Calculated) 15.7   Is BMI greater than 35 kg/m2? 0=No   Age older than 50 years old? 1=Yes   Is your neck circumference greater than 17 inches (Male) or 16 inches (Female)? 0   Gender - Male 0=No   STOP-BANG Total Score 2            Assessment and Plan:   1.  Biliary dyskinesia   Plan: Robot-assisted laparoscopic cholecystectomy with intraoperative cholangiogram 02/09/2024  2.  Abnormal EKG-patient rescheduled, cardiac clearance needed   Surgeon aware.  3.  Bladder cancer  4.  Hypertension  5.  Hypothyroidism  6.  Chads2 score 1 = 2.8%  7.  ASA 3

## 2024-02-07 NOTE — TELEPHONE ENCOUNTER
Patient had PAT appointment today (2/7/24), during appointment it was made aware that patient will need cardiac clearance before proceeding with surgery with Dr. Lugo. Dr. Lugo aware. Contacted patient to inform her of needed cardiac clearance and to call office back to reschedule surgery once clearance is received. Patient verbalized understanding and denied having any other questions at this time. [facility] schedulers aware of cancellation.

## 2024-02-07 NOTE — PROGRESS NOTES
"[facility]       Primary Care Physician: Jim Schultz MD  Date of Visit: 02/08/2024  2:40 PM EST     HPI / Summary:   Olga Grajeda is a 63 y.o. female with HTN and papillary urothelial carcinoma who presents for pre operative risk evaluation for lap-assisted cholecystectomy.     Pre-op admission testing included an ECG which showed an \"anteroseptal infarct\". This was reportedly similar to prior. She has occasional tightening around her lower abdomen. She had no chest pain, pressure, leg swelling or orthopnea.     ROS: Relevant review of symptoms of negative unless discussed above.     Problems:   Patient Active Problem List   Diagnosis    Hives    Nausea    Palpitations    Status post total thyroidectomy    Vitamin D deficiency    Other specified hypothyroidism    Hypertension    Malignant neoplasm of urinary bladder (CMS/HCC)    Hypothyroidism, iatrogenic    Gastroesophageal reflux disease    Dysfunctional gallbladder    Severely underweight adult       Medical History:   Past Medical History:   Diagnosis Date    Bladder cancer (CMS/HCC) 2023    Disease of thyroid gland     Disorder of thyroid, unspecified 11/03/2021    Thyroid mass    Hypertension     Thyroid nodule        Surgical Hx:   Past Surgical History:   Procedure Laterality Date    BLADDER TUMOR EXCISION  2023    THYROID SURGERY      TOTAL THYROIDECTOMY  03/2022        Family Hx:   Family History   Problem Relation Name Age of Onset    No Known Problems Mother      Cancer Father lyndsay         bladder    Cancer Brother          bladder      No kids  Siblings - two brothers - one is 3 years younger (bladder cancer, kidney cancer) and the other is 15 years younger (healthy).     Social Hx:  Fun: spend time with family, used to go to [employer]  Occupation/School: self employed doing paper goods  EtOH/Smoking/Drugs: smoke < 0.5 ppd, no alcohol, no drugs    Medications  Current Outpatient " "Medications   Medication Instructions    amLODIPine (NORVASC) 5 mg, oral, Daily    calcium 500 mg calcium (1,250 mg) tablet 2 tablets, oral, 3 times daily    cholecalciferol (Vitamin D-3) 50 mcg (2,000 unit) capsule 1 capsule, oral, Daily    docusate sodium 250 mg, oral, Daily    esomeprazole (NEXIUM) 40 mg, oral, Daily before breakfast, Do not open capsule.    famotidine (PEPCID) 40 mg, oral, 2 times daily    fluticasone-umeclidin-vilanter (Trelegy Ellipta) 200-62.5-25 mcg blister with device 1 puff, inhalation, Daily    levothyroxine (SYNTHROID, LEVOXYL) 75 mcg, oral, Daily    losartan (COZAAR) 50 mg, oral, 2 times daily    ondansetron ODT (ZOFRAN-ODT) 4 mg, oral, Every 8 hours PRN    senna 8.6 mg, oral, Nightly PRN       Allergies  Propranolol, Pantoprazole, and Sucralfate    Exam:   Vitals: /90 (BP Location: Left arm, Patient Position: Sitting, BP Cuff Size: Small adult)   Pulse 88   Ht 1.613 m (5' 3.5\")   Wt (!) 38.8 kg (85 lb 8 oz)   SpO2 95%   BMI 14.91 kg/m²   Wt Readings from Last 5 Encounters:   02/08/24 (!) 38.8 kg (85 lb 8 oz)   02/07/24 (!) 40.3 kg (88 lb 13.5 oz)   01/29/24 (!) 39.5 kg (87 lb)   01/04/24 (!) 40.6 kg (89 lb 6.4 oz)   12/05/23 (!) 40.4 kg (89 lb)     GEN: Pleasant, well-appearing, no acute distress, thin  HEENT: JVP not elevated  CHEST: Clear to auscultation, No wheeze, good air movement.  CV: normal rate, regular rhythm, no murmurs or rubs  ABD: Soft  EXT: Warm, well perfused, No LE edema.   NEURO: grossly non focal  SKIN: No obvious rashes     Labs:   Lipids  No results found for: \"CHOL\"  No results found for: \"HDL\"  No results found for: \"LDLCALC\"  No results found for: \"TRIG\"  No components found for: \"CHOLHDL\"    Hemoglobin A1C  No results found for: \"HGBA1C\"    [facility]  Lab Results   Component Value Date    GLUCOSE 102 (H) 09/08/2023    CALCIUM 9.5 09/08/2023     09/08/2023    K 3.9 09/08/2023    CO2 28 09/08/2023     09/08/2023    BUN 8 09/08/2023    CREATININE " "0.7 09/08/2023         Notable Studies: imaging personally reviewed and summarized by me below  EKG:  -8/8/2023: NSR, poor W wave progression, (V5 transition), no ST changes. ?Q wave in V2-3 (similar to 2021)    Echo:  -pending    Assessment and Plan  Olga Grajeda is a 63 y.o. female with HTN and papillary urothelial carcinoma who presents for pre operative risk evaluation for lap-assisted cholecystectomy.     Pre-op admission testing included an ECG which showed an \"anteroseptal infarct\". This was reportedly similar to prior. She has occasional tightening around her lower abdomen. She had no chest pain, pressure, leg swelling or orthopnea.     Will obtain an echocardiogram and if it is normal, suspect there won't be a contraindication to surgery. She has no murmurs to suggest critical valve disease and no known arrhythmias.     Will call with results.     Abdulaziz Collins MD  Director, Sports Cardiology  [facility]    Part of this note was completed using dictation and voice recognition software. Please excuse minor errors and typos.      "

## 2024-02-08 ENCOUNTER — OFFICE VISIT (OUTPATIENT)
Dept: CARDIOLOGY | Facility: CLINIC | Age: 64
End: 2024-02-08
Payer: COMMERCIAL

## 2024-02-08 VITALS
SYSTOLIC BLOOD PRESSURE: 165 MMHG | WEIGHT: 85.5 LBS | HEART RATE: 88 BPM | OXYGEN SATURATION: 95 % | HEIGHT: 64 IN | BODY MASS INDEX: 14.6 KG/M2 | DIASTOLIC BLOOD PRESSURE: 90 MMHG

## 2024-02-08 DIAGNOSIS — R94.31 ABNORMAL EKG: Primary | ICD-10-CM

## 2024-02-08 PROCEDURE — 3080F DIAST BP >= 90 MM HG: CPT | Performed by: INTERNAL MEDICINE

## 2024-02-08 PROCEDURE — 99214 OFFICE O/P EST MOD 30 MIN: CPT | Performed by: INTERNAL MEDICINE

## 2024-02-08 PROCEDURE — 99204 OFFICE O/P NEW MOD 45 MIN: CPT | Performed by: INTERNAL MEDICINE

## 2024-02-08 PROCEDURE — 3077F SYST BP >= 140 MM HG: CPT | Performed by: INTERNAL MEDICINE

## 2024-02-08 ASSESSMENT — PATIENT HEALTH QUESTIONNAIRE - PHQ9
SUM OF ALL RESPONSES TO PHQ9 QUESTIONS 1 AND 2: 0
1. LITTLE INTEREST OR PLEASURE IN DOING THINGS: NOT AT ALL
2. FEELING DOWN, DEPRESSED OR HOPELESS: NOT AT ALL

## 2024-02-08 ASSESSMENT — COLUMBIA-SUICIDE SEVERITY RATING SCALE - C-SSRS
2. HAVE YOU ACTUALLY HAD ANY THOUGHTS OF KILLING YOURSELF?: NO
6. HAVE YOU EVER DONE ANYTHING, STARTED TO DO ANYTHING, OR PREPARED TO DO ANYTHING TO END YOUR LIFE?: NO
1. IN THE PAST MONTH, HAVE YOU WISHED YOU WERE DEAD OR WISHED YOU COULD GO TO SLEEP AND NOT WAKE UP?: NO

## 2024-02-08 ASSESSMENT — PAIN SCALES - GENERAL: PAINLEVEL: 6

## 2024-02-08 ASSESSMENT — ENCOUNTER SYMPTOMS
OCCASIONAL FEELINGS OF UNSTEADINESS: 0
LOSS OF SENSATION IN FEET: 0
DEPRESSION: 0

## 2024-02-09 ENCOUNTER — APPOINTMENT (OUTPATIENT)
Dept: CARDIOLOGY | Facility: CLINIC | Age: 64
End: 2024-02-09
Payer: COMMERCIAL

## 2024-02-14 LAB
ATRIAL RATE: 87 BPM
P AXIS: 88 DEGREES
P OFFSET: 205 MS
P ONSET: 163 MS
PR INTERVAL: 124 MS
Q ONSET: 225 MS
QRS COUNT: 14 BEATS
QRS DURATION: 72 MS
QT INTERVAL: 364 MS
QTC CALCULATION(BAZETT): 438 MS
QTC FREDERICIA: 411 MS
R AXIS: 62 DEGREES
T AXIS: 64 DEGREES
T OFFSET: 407 MS
VENTRICULAR RATE: 87 BPM

## 2024-02-15 ENCOUNTER — HOSPITAL ENCOUNTER (OUTPATIENT)
Dept: CARDIOLOGY | Facility: CLINIC | Age: 64
Discharge: HOME | End: 2024-02-15
Payer: COMMERCIAL

## 2024-02-15 DIAGNOSIS — R94.31 ABNORMAL EKG: ICD-10-CM

## 2024-02-15 PROCEDURE — 93306 TTE W/DOPPLER COMPLETE: CPT

## 2024-02-15 PROCEDURE — 93306 TTE W/DOPPLER COMPLETE: CPT | Performed by: STUDENT IN AN ORGANIZED HEALTH CARE EDUCATION/TRAINING PROGRAM

## 2024-02-16 ENCOUNTER — TELEPHONE (OUTPATIENT)
Dept: CARDIOLOGY | Facility: HOSPITAL | Age: 64
End: 2024-02-16
Payer: COMMERCIAL

## 2024-02-16 DIAGNOSIS — Q23.1 BICUSPID AORTIC VALVE (HHS-HCC): ICD-10-CM

## 2024-02-16 DIAGNOSIS — I51.89 RIGHT ATRIAL MASS: Primary | ICD-10-CM

## 2024-02-16 LAB
AORTIC VALVE PEAK VELOCITY: 1.04 M/S
AV PEAK GRADIENT: 4.4 MMHG
AVA (PEAK VEL): 2.6 CM2
EJECTION FRACTION APICAL 4 CHAMBER: 65
EJECTION FRACTION: 68 %
LEFT ATRIUM VOLUME AREA LENGTH INDEX BSA: 28.8 ML/M2
LEFT VENTRICLE INTERNAL DIMENSION DIASTOLE: 3.65 CM (ref 3.5–6)
LEFT VENTRICULAR OUTFLOW TRACT DIAMETER: 1.97 CM
MITRAL VALVE E/A RATIO: 1.03
MITRAL VALVE E/E' RATIO: 8.9
RIGHT VENTRICLE FREE WALL PEAK S': 13.32 CM/S
TRICUSPID ANNULAR PLANE SYSTOLIC EXCURSION: 1.8 CM

## 2024-02-16 NOTE — PROGRESS NOTES
Called to discuss right atrial mass seen on echo. Will order cardiac MRI to evaluate the right atrial mass. Hold off on biliary surgery until we know what the mass in the right atrium is.     Abdulaziz Collins MD

## 2024-02-20 ENCOUNTER — HOSPITAL ENCOUNTER (OUTPATIENT)
Dept: RADIOLOGY | Facility: HOSPITAL | Age: 64
Discharge: HOME | End: 2024-02-20
Payer: COMMERCIAL

## 2024-02-20 DIAGNOSIS — Q23.1 BICUSPID AORTIC VALVE (HHS-HCC): ICD-10-CM

## 2024-02-20 DIAGNOSIS — I51.89 RIGHT ATRIAL MASS: ICD-10-CM

## 2024-02-20 PROCEDURE — 2550000001 HC RX 255 CONTRASTS: Performed by: INTERNAL MEDICINE

## 2024-02-20 PROCEDURE — A9575 INJ GADOTERATE MEGLUMI 0.1ML: HCPCS | Performed by: INTERNAL MEDICINE

## 2024-02-20 PROCEDURE — 75561 CARDIAC MRI FOR MORPH W/DYE: CPT

## 2024-02-20 RX ORDER — GADOTERATE MEGLUMINE 376.9 MG/ML
11 INJECTION INTRAVENOUS
Status: COMPLETED | OUTPATIENT
Start: 2024-02-20 | End: 2024-02-20

## 2024-02-20 RX ADMIN — GADOTERATE MEGLUMINE 11 ML: 376.9 INJECTION INTRAVENOUS at 11:37

## 2024-02-20 NOTE — RESULT ENCOUNTER NOTE
Cardiac MRI reviewed. No right atrial mass. There is a prominent john terminalis. No evidence of prior infarction. The CMR also showed the aortic valve is tricuspid (it was suspected to be bicuspid on the echo). There is no contraindication to her proceeding with surgery from a cardiac perspective.

## 2024-02-21 ENCOUNTER — TELEPHONE (OUTPATIENT)
Dept: SURGERY | Facility: CLINIC | Age: 64
End: 2024-02-21
Payer: COMMERCIAL

## 2024-02-21 NOTE — TELEPHONE ENCOUNTER
Patient contacted office regarding rescheduling surgery with Dr. Lugo at [facility]. Patient was originally scheduled on 02/09/2024 but it was cancelled due to an abnormal EKG at [facility]. Patient saw cardiologist and was cleared for surgery (encounter 02/20/2024 with Dr. Collins). New case request orders to be placed to reschedule surgery.

## 2024-02-21 NOTE — LETTER
February 23, 2024    Olga Grajeda  [address] [address]      Dear MsAlpa Taras:    Thank you for scheduling surgery with Dr. Lugo. Below you will find your Surgery Itinerary to include dates, times, and locations for appointments involved with your procedure.    On the day before the scheduled surgery, please call the Same Day Surgery department between 2-4 pm for a time of arrival for the day of procedure.  [facility] [phone]    Nothing to eat or drink after midnight the night before surgery    Surgery with Dr. Lugo at [facility] - [address], [place] [address]  On Friday March 8th, 2024     Post Operative appointment at Postoperative appointment is scheduled at [facility] General Surgery office: [address], [place] [address]  On Thursday March 21st, 2024 at 10am  *Please note, you may receive a call from our financial counselors if you have a financial liability greater than $250.       [facility] [place]  Pre - Operative Instructions     Your time of arrival for surgery is available the day before surgery. *If the surgery is on a Monday, or the Tuesday following a Monday Holiday, please call Same Day Surgery the Friday before your surgery date.*  DO NOT EAT OR DRINK ANYTHING AFTER MIDNIGHT THE NIGHT BEFORE SURGERY. This includes any beverages (coffee, water, soda, etc.), hard candy, gum or mints. If this is not followed, surgery may be canceled. Please avoid eating a large meal the evening before surgery. Please do not DRINK ALCOHOL or SMOKE FOR 24 HOURS before surgery.   Insulin Instructions - Please do not take any short acting Insulin (Regular or NPH). Do not take any oral diabetic medication on the day of surgery. Long acting Insulins may be taken (Lantus). We will check your blood sugar and administer at the hospital the day of surgery. Patient who have Insulin pumps are to make NO adjustments.    Prescription Medications - You are encouraged to take prescription medications including heart, blood pressure, anti-seizure, anxiety, breathing medications (including inhalers) with exception to diabetic medications prior to arriving at the hospital the day of surgery. You may take prescribed pain medications as needed. Please remember the dose and time taken so we may inform your Anesthesiologist.   Please bring the name, dosage, and frequency of your medications if you did not provide these on the day of Pre Admission Testing. You may bring the actual bottles if this would be easier for you.   Please bring your prescribed inhalers with you the morning of surgery.   Patients on Anti-platelet and Anticoagulant agents, please read the following:  ASA, NSAIDS stop 5 days prior to surgery  Coumadin stop 5 days prior to surgery unless bridging therapy is needed (metal valve replacement, cardiac stent placement) - if so please speak to your Cardiologist or prescribing physician.   Other anti-platelet and anticoagulant agents:  Plavix (Clopidogrel) stop 5 days prior to surgery  Brilinta (Ticagrelor) stop 5 days prior to surgery   Effient (Prasugrel) stop 7 days prior to surgery   Lovenox (Enoxaparin) stop 24 hours prior to surgery  Arixtra (Fondaparinux) stop 5 days prior to surgery  Xarelto (Rivaroxaban) stop 3 days prior to surgery  Pradaxa (Dabigatran) stop 5 days prior to surgery  Eliquis (Apixaban) stop 3 days prior to surgery   Savaysa (Endoxaban) stop days prior to surgery     Please stop all herbal medications 2 weeks prior to surgery   C-PAP Devices - If you have a C-PAP device at home, bring it with you on our day of surgery if your surgery requires you to stay overnight.   Please bring a copy of any Advanced Directives the day of surgery if you did not provide it at Pre Admission Testing. These documents are living son and durable power of  for healthcare.   Please notify your physician/surgeon if  you develop a cold, sore throat, fever, flu symptoms, COVID symptoms or any changes in your physical conditions.   A shower or bath is preferred the evening before or the morning of surgery.   Remove jewelry before admission to the hospital. It is no longer permitted to tape rings. We ask that you leave all valuables at home. Any items of value will be given to a family member or locked up by security.   If you have a body piercing g that you cannot remove, it is recommended that you have it removed professionally and have a plastic spacer inserted. There is a risk for surgical burns with jewelry left in place.   Remove or wear minimal makeup the day of surgery. You will be asked to remove glasses and contact lenses prior to surgery. Please bring a glass case and/or contact lens case with you. These items are not provided.   Dentures and partials are usually removed prior to surgery. A denture cup will be provided for you.   You may be asked to remove nail polish. Acrylic nails are now acceptable.   Wear loose comfortable clothing that you will be able to fit over bandages when you leave the hospitals (as appropriate for your surgery).  Patients that are under the age of 18 years must have a parent or guardian present the day of surgery.   Family members of significant others may stay with you on the Same Day Surgery unit. While you are in surgery, they may wait for you in the family waiting area. The physician will speak to the waiting family, if permitted by the patient, after surgery.   Changes or delays in the surgery schedule may occur due to emergencies. The hospital will notify you if this occurs. We apologize for any inconveniences this may present.   You must have a responsible  available to drive you home after surgery. You will not be permitted to drive yourself home after surgery if you have received any anesthesia or sedation during your procedure.   Please visit our website at [email or web address] for  more information regarding [facility] [place] services.    For questions about your Pre Admission Testing (PAT)  [facility] [phone]  [facility] [phone]    Thank you for choosing [facility] [place]!      If you have any questions or concerns, please don't hesitate to call.    Sincerely,    Juan Lugo MD

## 2024-02-22 ENCOUNTER — APPOINTMENT (OUTPATIENT)
Dept: SURGERY | Facility: CLINIC | Age: 64
End: 2024-02-22
Payer: COMMERCIAL

## 2024-02-22 ENCOUNTER — PREP FOR PROCEDURE (OUTPATIENT)
Dept: SURGERY | Facility: HOSPITAL | Age: 64
End: 2024-02-22
Payer: COMMERCIAL

## 2024-02-22 DIAGNOSIS — K81.9 CHOLECYSTITIS: Primary | ICD-10-CM

## 2024-02-22 ASSESSMENT — ENCOUNTER SYMPTOMS
DIFFICULTY URINATING: 0
BLOOD IN STOOL: 0
SHORTNESS OF BREATH: 0
ADENOPATHY: 0
SPEECH DIFFICULTY: 0
DIARRHEA: 0
WEAKNESS: 0
NERVOUS/ANXIOUS: 0
CONSTIPATION: 0
FEVER: 0
BACK PAIN: 0
SORE THROAT: 0
NECK PAIN: 0
ABDOMINAL DISTENTION: 0
WOUND: 0
ABDOMINAL PAIN: 1
EYE REDNESS: 0
CHEST TIGHTNESS: 0
CONFUSION: 0
LIGHT-HEADEDNESS: 0
BRUISES/BLEEDS EASILY: 0
CHILLS: 0
VOMITING: 0
NAUSEA: 0
FACIAL SWELLING: 0
COLOR CHANGE: 0
TROUBLE SWALLOWING: 0

## 2024-02-22 NOTE — H&P (VIEW-ONLY)
History Of Present Illness  Olga Grajeda is a 63 y.o. female presenting with cholecystitis.  Prior surgery was canceled for abnormal EKG.  She saw her cardiologist and is cleared for surgery.     Past Medical History  Past Medical History:   Diagnosis Date    Bladder cancer (CMS/HCC) 2023    Disease of thyroid gland     Disorder of thyroid, unspecified 11/03/2021    Thyroid mass    Hypertension     Thyroid nodule        Surgical History  Past Surgical History:   Procedure Laterality Date    BLADDER TUMOR EXCISION  2023    THYROID SURGERY      TOTAL THYROIDECTOMY  03/2022        Social History  She reports that she has been smoking cigarettes. She started smoking about 39 years ago. She has a 7.50 pack-year smoking history. She has never used smokeless tobacco. She reports that she does not drink alcohol and does not use drugs.    Family History  Family History   Problem Relation Name Age of Onset    No Known Problems Mother      Cancer Father lyndsay         bladder    Cancer Brother          bladder        Allergies  Propranolol, Pantoprazole, and Sucralfate    Review of Systems   Constitutional:  Negative for chills and fever.   HENT:  Negative for facial swelling, sore throat and trouble swallowing.    Eyes:  Negative for redness and visual disturbance.   Respiratory:  Negative for chest tightness and shortness of breath.    Cardiovascular:  Negative for chest pain and leg swelling.   Gastrointestinal:  Positive for abdominal pain. Negative for abdominal distention, blood in stool, constipation, diarrhea, nausea and vomiting.   Endocrine: Negative for cold intolerance and heat intolerance.   Genitourinary:  Negative for difficulty urinating and enuresis.   Musculoskeletal:  Negative for back pain, gait problem and neck pain.   Skin:  Negative for color change, rash and wound.   Allergic/Immunologic: Negative for immunocompromised state.   Neurological:  Negative for speech difficulty, weakness and  light-headedness.   Hematological:  Negative for adenopathy. Does not bruise/bleed easily.   Psychiatric/Behavioral:  Negative for confusion. The patient is not nervous/anxious.         Physical Exam  Constitutional:       General: She is not in acute distress.     Appearance: She is not toxic-appearing.   HENT:      Head: Normocephalic and atraumatic.      Mouth/Throat:      Mouth: Mucous membranes are moist.      Pharynx: Oropharynx is clear.   Eyes:      General: No scleral icterus.     Extraocular Movements: Extraocular movements intact.      Pupils: Pupils are equal, round, and reactive to light.   Cardiovascular:      Rate and Rhythm: Normal rate and regular rhythm.   Pulmonary:      Effort: Pulmonary effort is normal.      Breath sounds: Normal breath sounds.   Abdominal:      General: There is no distension.      Palpations: Abdomen is soft. There is no mass.      Tenderness: There is no abdominal tenderness. There is no guarding.      Hernia: No hernia is present.   Musculoskeletal:         General: No swelling. Normal range of motion.      Cervical back: Normal range of motion.   Skin:     General: Skin is warm and dry.      Coloration: Skin is not jaundiced.   Neurological:      General: No focal deficit present.      Mental Status: She is alert and oriented to person, place, and time.   Psychiatric:         Mood and Affect: Mood normal.         Behavior: Behavior normal.          Last Recorded Vitals  There were no vitals taken for this visit.    Relevant Results             Assessment/Plan   Problem List Items Addressed This Visit    None  Visit Diagnoses         Codes    Cholecystitis    -  Primary K81.9            The procedure of laparoscopic cholecystectomy was discussed with the patient, including the traditional four-port laparoscopic approach with clipping of the cystic duct and artery, and removal of the gallbladder through the umbilicus.    Risks, benefits, and alternatives laparoscopic  cholecystectomy with intraoperative cholangiography were described. Risks including but not limited to bleeding, infection, bile leak or retained stone, need for endoscopic retrograde cholangiopancreatography, common duct injury, need for secondary procedures and related procedures. We discussed the risks of bile leak, retained stone, and potential need for ERCP to be less than 2%. Patient understands risks and agrees to proceed.    Patient has a low BMI and is increased risk for perioperative complications.  She states that this has been her weight for the majority of her life.             Juan Lugo MD

## 2024-02-23 PROBLEM — K81.9 CHOLECYSTITIS: Status: ACTIVE | Noted: 2024-02-22

## 2024-02-23 NOTE — TELEPHONE ENCOUNTER
New case request placed by Dr. Lugo. Patient is scheduled for 03/08/2024 at [place] with Dr. Lugo. Surgery itinerary and pre op instructions available to patient via [email or web address] and mailed to patient (patient request). Patient verbalized understanding and denied having any other questions at this time.

## 2024-02-26 ENCOUNTER — TELEPHONE (OUTPATIENT)
Dept: SURGERY | Facility: CLINIC | Age: 64
End: 2024-02-26
Payer: COMMERCIAL

## 2024-02-26 NOTE — LETTER
February 27, 2024    Olga Grajeda  [address] [address]    Dear Ms. Grajeda:    Thank you for scheduling surgery with Dr. Lugo. Below you will find your Surgery Itinerary to include dates, times, and locations for appointments involved with your procedure.    On the day before the scheduled surgery, please call the Same Day Surgery department between 2-4 pm for a time of arrival for the day of procedure.  [facility] [phone]    Nothing to eat or drink after midnight the night before surgery    Surgery with Dr. Lugo at [facility] - [address], [place] [address]   On Tuesday March 19th, 2024     Post Operative appointment at Postoperative appointment is scheduled at [facility] General Surgery office: [address], [place] [address]  On Monday April 1st, 2024 at 9:30am    *Please note, you may receive a call from our financial counselors if you have a financial liability greater than $250.       [facility]  Pre - Operative Instructions     Your time of arrival for surgery is available the day before surgery. *If the surgery is on a Monday, or the Tuesday following a Monday Holiday, please call Same Day Surgery the Friday before your surgery date.*  DO NOT EAT OR DRINK ANYTHING AFTER MIDNIGHT THE NIGHT BEFORE SURGERY. This includes any beverages (coffee, water, soda, etc.), hard candy, gum or mints. If this is not followed, surgery may be canceled. Please avoid eating a large meal the evening before surgery. Please do not DRINK ALCOHOL or SMOKE FOR 24 HOURS before surgery.   Insulin Instructions - Please do not take any short acting Insulin (Regular or NPH). Do not take any oral diabetic medication on the day of surgery. Long acting Insulins may be taken (Lantus). We will check your blood sugar and administer at the hospital the day of surgery. Patient who have Insulin pumps are to make NO adjustments.   Prescription  Medications - You are encouraged to take prescription medications including heart, blood pressure, anti-seizure, anxiety, breathing medications (including inhalers) with exception to diabetic medications prior to arriving at the hospital the day of surgery. You may take prescribed pain medications as needed. Please remember the dose and time taken so we may inform your Anesthesiologist.   Please bring the name, dosage, and frequency of your medications if you did not provide these on the day of Pre Admission Testing. You may bring the actual bottles if this would be easier for you.   Please bring your prescribed inhalers with you the morning of surgery.   Patients on Anti-platelet and Anticoagulant agents, please read the following:  ASA, NSAIDS stop 5 days prior to surgery  Coumadin stop 5 days prior to surgery unless bridging therapy is needed (metal valve replacement, cardiac stent placement) - if so please speak to your Cardiologist or prescribing physician.   Other anti-platelet and anticoagulant agents:  Plavix (Clopidogrel) stop 5 days prior to surgery  Brilinta (Ticagrelor) stop 5 days prior to surgery   Effient (Prasugrel) stop 7 days prior to surgery   Lovenox (Enoxaparin) stop 24 hours prior to surgery  Arixtra (Fondaparinux) stop 5 days prior to surgery  Xarelto (Rivaroxaban) stop 3 days prior to surgery  Pradaxa (Dabigatran) stop 5 days prior to surgery  Eliquis (Apixaban) stop 3 days prior to surgery   Savaysa (Endoxaban) stop days prior to surgery     Please stop all herbal medications 2 weeks prior to surgery   C-PAP Devices - If you have a C-PAP device at home, bring it with you on our day of surgery if your surgery requires you to stay overnight.   Please bring a copy of any Advanced Directives the day of surgery if you did not provide it at Pre Admission Testing. These documents are living son and durable power of  for healthcare.   Please notify your physician/surgeon if you develop a  cold, sore throat, fever, flu symptoms, COVID symptoms or any changes in your physical conditions.   A shower or bath is preferred the evening before or the morning of surgery.   Remove jewelry before admission to the hospital. It is no longer permitted to tape rings. We ask that you leave all valuables at home. Any items of value will be given to a family member or locked up by security.   If you have a body piercing g that you cannot remove, it is recommended that you have it removed professionally and have a plastic spacer inserted. There is a risk for surgical burns with jewelry left in place.   Remove or wear minimal makeup the day of surgery. You will be asked to remove glasses and contact lenses prior to surgery. Please bring a glass case and/or contact lens case with you. These items are not provided.   Dentures and partials are usually removed prior to surgery. A denture cup will be provided for you.   You may be asked to remove nail polish. Acrylic nails are now acceptable.   Wear loose comfortable clothing that you will be able to fit over bandages when you leave the hospitals (as appropriate for your surgery).  Patients that are under the age of 18 years must have a parent or guardian present the day of surgery.   Family members of significant others may stay with you on the Same Day Surgery unit. While you are in surgery, they may wait for you in the family waiting area. The physician will speak to the waiting family, if permitted by the patient, after surgery.   Changes or delays in the surgery schedule may occur due to emergencies. The hospital will notify you if this occurs. We apologize for any inconveniences this may present.   You must have a responsible  available to drive you home after surgery. You will not be permitted to drive yourself home after surgery if you have received any anesthesia or sedation during your procedure.   Please visit our website at [email or web address] for more  information regarding [facility] services.    For questions about your Pre Admission Testing (PAT)  [facility] [phone]  [facility] [phone]    Thank you for choosing [facility]!      If you have any questions or concerns, please don't hesitate to call.     Sincerely,    Juan Lugo MD  Office Phone: [phone]

## 2024-02-26 NOTE — TELEPHONE ENCOUNTER
Contacted patient regarding surgery scheduled with Dr. Lugo on 03/08/2024 at [place]. This case is going to be transitioned over to [facility]. The date of 03/05/2024 was offered to the patient, the patient declined. Notified that the office will call with a new date once [facility] blocks have ez determined. Patient verbalized understanding.

## 2024-02-27 NOTE — TELEPHONE ENCOUNTER
Contacted patient regarding possible dates for surgery with Dr. Lugo at [facility]. Patient chose 03/19/2024. Updated surgery itinerary and pre op instructions available to patient via [employer] and mailed to patient (patient request). Patient verbalized understanding and denied having any other questions at this time.

## 2024-03-01 ENCOUNTER — APPOINTMENT (OUTPATIENT)
Dept: PREADMISSION TESTING | Facility: HOSPITAL | Age: 64
End: 2024-03-01
Payer: COMMERCIAL

## 2024-03-06 ENCOUNTER — APPOINTMENT (OUTPATIENT)
Dept: PREADMISSION TESTING | Facility: HOSPITAL | Age: 64
End: 2024-03-06
Payer: COMMERCIAL

## 2024-03-09 ENCOUNTER — LAB (OUTPATIENT)
Dept: LAB | Facility: LAB | Age: 64
End: 2024-03-09
Payer: COMMERCIAL

## 2024-03-09 DIAGNOSIS — R63.6 SEVERELY UNDERWEIGHT ADULT: ICD-10-CM

## 2024-03-09 DIAGNOSIS — K82.8 BILIARY DYSKINESIA: ICD-10-CM

## 2024-03-09 LAB
ALBUMIN SERPL BCP-MCNC: 4.2 G/DL (ref 3.4–5)
ALP SERPL-CCNC: 91 U/L (ref 33–136)
ALT SERPL W P-5'-P-CCNC: 12 U/L (ref 7–45)
ANION GAP SERPL CALC-SCNC: 14 MMOL/L (ref 10–20)
AST SERPL W P-5'-P-CCNC: 15 U/L (ref 9–39)
BILIRUB SERPL-MCNC: 0.5 MG/DL (ref 0–1.2)
BUN SERPL-MCNC: 6 MG/DL (ref 6–23)
CALCIUM SERPL-MCNC: 9.3 MG/DL (ref 8.6–10.6)
CHLORIDE SERPL-SCNC: 94 MMOL/L (ref 98–107)
CO2 SERPL-SCNC: 31 MMOL/L (ref 21–32)
CREAT SERPL-MCNC: 0.58 MG/DL (ref 0.5–1.05)
EGFRCR SERPLBLD CKD-EPI 2021: >90 ML/MIN/1.73M*2
ERYTHROCYTE [DISTWIDTH] IN BLOOD BY AUTOMATED COUNT: 15.9 % (ref 11.5–14.5)
GLUCOSE SERPL-MCNC: 140 MG/DL (ref 74–99)
HCT VFR BLD AUTO: 39 % (ref 36–46)
HGB BLD-MCNC: 12.2 G/DL (ref 12–16)
MCH RBC QN AUTO: 20.3 PG (ref 26–34)
MCHC RBC AUTO-ENTMCNC: 31.3 G/DL (ref 32–36)
MCV RBC AUTO: 65 FL (ref 80–100)
NRBC BLD-RTO: 0 /100 WBCS (ref 0–0)
PLATELET # BLD AUTO: 521 X10*3/UL (ref 150–450)
POTASSIUM SERPL-SCNC: 3.2 MMOL/L (ref 3.5–5.3)
PROT SERPL-MCNC: 7 G/DL (ref 6.4–8.2)
RBC # BLD AUTO: 6.02 X10*6/UL (ref 4–5.2)
SODIUM SERPL-SCNC: 136 MMOL/L (ref 136–145)
WBC # BLD AUTO: 13.6 X10*3/UL (ref 4.4–11.3)

## 2024-03-09 PROCEDURE — 80053 COMPREHEN METABOLIC PANEL: CPT

## 2024-03-09 PROCEDURE — 36415 COLL VENOUS BLD VENIPUNCTURE: CPT

## 2024-03-09 PROCEDURE — 85027 COMPLETE CBC AUTOMATED: CPT

## 2024-03-15 DIAGNOSIS — R10.13 EPIGASTRIC PAIN: ICD-10-CM

## 2024-03-18 RX ORDER — FAMOTIDINE 40 MG/1
40 TABLET, FILM COATED ORAL 2 TIMES DAILY
Qty: 90 TABLET | Refills: 1 | Status: SHIPPED | OUTPATIENT
Start: 2024-03-18 | End: 2024-06-16

## 2024-03-19 ENCOUNTER — HOSPITAL ENCOUNTER (OUTPATIENT)
Facility: HOSPITAL | Age: 64
Setting detail: OUTPATIENT SURGERY
Discharge: HOME | End: 2024-03-19
Attending: SURGERY | Admitting: SURGERY
Payer: COMMERCIAL

## 2024-03-19 ENCOUNTER — ANESTHESIA EVENT (OUTPATIENT)
Dept: OPERATING ROOM | Facility: HOSPITAL | Age: 64
End: 2024-03-19
Payer: COMMERCIAL

## 2024-03-19 ENCOUNTER — APPOINTMENT (OUTPATIENT)
Dept: RADIOLOGY | Facility: HOSPITAL | Age: 64
End: 2024-03-19
Payer: COMMERCIAL

## 2024-03-19 ENCOUNTER — ANESTHESIA (OUTPATIENT)
Dept: OPERATING ROOM | Facility: HOSPITAL | Age: 64
End: 2024-03-19
Payer: COMMERCIAL

## 2024-03-19 VITALS
HEART RATE: 75 BPM | WEIGHT: 88.85 LBS | TEMPERATURE: 97.2 F | OXYGEN SATURATION: 91 % | BODY MASS INDEX: 15.49 KG/M2 | DIASTOLIC BLOOD PRESSURE: 72 MMHG | SYSTOLIC BLOOD PRESSURE: 139 MMHG | RESPIRATION RATE: 16 BRPM

## 2024-03-19 DIAGNOSIS — E89.0 STATUS POST TOTAL THYROIDECTOMY: ICD-10-CM

## 2024-03-19 DIAGNOSIS — K82.8 DYSFUNCTIONAL GALLBLADDER: Primary | ICD-10-CM

## 2024-03-19 DIAGNOSIS — K81.9 CHOLECYSTITIS: ICD-10-CM

## 2024-03-19 PROBLEM — J44.9 CHRONIC OBSTRUCTIVE PULMONARY DISEASE (MULTI): Status: ACTIVE | Noted: 2024-03-19

## 2024-03-19 PROCEDURE — A47563 PR LAP,CHOLECYSTECTOMY/GRAPH: Performed by: NURSE ANESTHETIST, CERTIFIED REGISTERED

## 2024-03-19 PROCEDURE — A47563 PR LAP,CHOLECYSTECTOMY/GRAPH: Performed by: ANESTHESIOLOGY

## 2024-03-19 PROCEDURE — 2720000007 HC OR 272 NO HCPCS: Performed by: SURGERY

## 2024-03-19 PROCEDURE — 7100000010 HC PHASE TWO TIME - EACH INCREMENTAL 1 MINUTE: Performed by: SURGERY

## 2024-03-19 PROCEDURE — 7100000001 HC RECOVERY ROOM TIME - INITIAL BASE CHARGE: Performed by: SURGERY

## 2024-03-19 PROCEDURE — 7100000009 HC PHASE TWO TIME - INITIAL BASE CHARGE: Performed by: SURGERY

## 2024-03-19 PROCEDURE — 3700000002 HC GENERAL ANESTHESIA TIME - EACH INCREMENTAL 1 MINUTE: Performed by: SURGERY

## 2024-03-19 PROCEDURE — 7100000002 HC RECOVERY ROOM TIME - EACH INCREMENTAL 1 MINUTE: Performed by: SURGERY

## 2024-03-19 PROCEDURE — 47563 LAPARO CHOLECYSTECTOMY/GRAPH: CPT | Performed by: SURGERY

## 2024-03-19 PROCEDURE — 74300 X-RAY BILE DUCTS/PANCREAS: CPT | Performed by: RADIOLOGY

## 2024-03-19 PROCEDURE — 2500000004 HC RX 250 GENERAL PHARMACY W/ HCPCS (ALT 636 FOR OP/ED): Performed by: ANESTHESIOLOGY

## 2024-03-19 PROCEDURE — 2780000003 HC OR 278 NO HCPCS: Performed by: SURGERY

## 2024-03-19 PROCEDURE — 2500000004 HC RX 250 GENERAL PHARMACY W/ HCPCS (ALT 636 FOR OP/ED): Performed by: NURSE ANESTHETIST, CERTIFIED REGISTERED

## 2024-03-19 PROCEDURE — 2500000005 HC RX 250 GENERAL PHARMACY W/O HCPCS: Performed by: NURSE ANESTHETIST, CERTIFIED REGISTERED

## 2024-03-19 PROCEDURE — A4550 SURGICAL TRAYS: HCPCS | Performed by: SURGERY

## 2024-03-19 PROCEDURE — 3700000001 HC GENERAL ANESTHESIA TIME - INITIAL BASE CHARGE: Performed by: SURGERY

## 2024-03-19 PROCEDURE — 47563 LAPARO CHOLECYSTECTOMY/GRAPH: CPT | Performed by: PHYSICIAN ASSISTANT

## 2024-03-19 PROCEDURE — 88304 TISSUE EXAM BY PATHOLOGIST: CPT | Mod: TC | Performed by: SURGERY

## 2024-03-19 PROCEDURE — 2500000005 HC RX 250 GENERAL PHARMACY W/O HCPCS: Performed by: SURGERY

## 2024-03-19 PROCEDURE — 3600000008 HC OR TIME - EACH INCREMENTAL 1 MINUTE - PROCEDURE LEVEL THREE: Performed by: SURGERY

## 2024-03-19 PROCEDURE — 3600000003 HC OR TIME - INITIAL BASE CHARGE - PROCEDURE LEVEL THREE: Performed by: SURGERY

## 2024-03-19 PROCEDURE — 2500000005 HC RX 250 GENERAL PHARMACY W/O HCPCS: Performed by: ANESTHESIOLOGY

## 2024-03-19 PROCEDURE — 74300 X-RAY BILE DUCTS/PANCREAS: CPT

## 2024-03-19 PROCEDURE — 88304 TISSUE EXAM BY PATHOLOGIST: CPT | Performed by: PATHOLOGY

## 2024-03-19 PROCEDURE — 2500000002 HC RX 250 W HCPCS SELF ADMINISTERED DRUGS (ALT 637 FOR MEDICARE OP, ALT 636 FOR OP/ED): Performed by: ANESTHESIOLOGY

## 2024-03-19 RX ORDER — ONDANSETRON HYDROCHLORIDE 2 MG/ML
4 INJECTION, SOLUTION INTRAVENOUS ONCE AS NEEDED
Status: DISCONTINUED | OUTPATIENT
Start: 2024-03-19 | End: 2024-03-19 | Stop reason: HOSPADM

## 2024-03-19 RX ORDER — BUPIVACAINE HYDROCHLORIDE 2.5 MG/ML
INJECTION, SOLUTION INFILTRATION; PERINEURAL AS NEEDED
Status: DISCONTINUED | OUTPATIENT
Start: 2024-03-19 | End: 2024-03-19 | Stop reason: HOSPADM

## 2024-03-19 RX ORDER — IPRATROPIUM BROMIDE AND ALBUTEROL SULFATE 2.5; .5 MG/3ML; MG/3ML
3 SOLUTION RESPIRATORY (INHALATION)
Status: DISCONTINUED | OUTPATIENT
Start: 2024-03-19 | End: 2024-03-19 | Stop reason: HOSPADM

## 2024-03-19 RX ORDER — IBUPROFEN 800 MG/1
800 TABLET ORAL EVERY 8 HOURS
Qty: 90 TABLET | Refills: 0 | Status: SHIPPED | OUTPATIENT
Start: 2024-03-19 | End: 2024-04-18

## 2024-03-19 RX ORDER — DIPHENHYDRAMINE HYDROCHLORIDE 50 MG/ML
12.5 INJECTION INTRAMUSCULAR; INTRAVENOUS ONCE AS NEEDED
Status: DISCONTINUED | OUTPATIENT
Start: 2024-03-19 | End: 2024-03-19 | Stop reason: HOSPADM

## 2024-03-19 RX ORDER — MEPERIDINE HYDROCHLORIDE 25 MG/ML
12.5 INJECTION INTRAMUSCULAR; INTRAVENOUS; SUBCUTANEOUS EVERY 10 MIN PRN
Status: DISCONTINUED | OUTPATIENT
Start: 2024-03-19 | End: 2024-03-19 | Stop reason: HOSPADM

## 2024-03-19 RX ORDER — ACETAMINOPHEN 500 MG
500 TABLET ORAL EVERY 4 HOURS
Qty: 120 TABLET | Refills: 0 | Status: SHIPPED | OUTPATIENT
Start: 2024-03-19 | End: 2024-04-18

## 2024-03-19 RX ORDER — OXYCODONE HYDROCHLORIDE 5 MG/1
5 TABLET ORAL EVERY 4 HOURS PRN
Status: DISCONTINUED | OUTPATIENT
Start: 2024-03-19 | End: 2024-03-19 | Stop reason: HOSPADM

## 2024-03-19 RX ORDER — GUAIFENESIN 100 MG/5ML
200 SOLUTION ORAL 3 TIMES DAILY PRN
COMMUNITY

## 2024-03-19 RX ORDER — LEVOTHYROXINE SODIUM 75 UG/1
TABLET ORAL
Qty: 30 TABLET | Refills: 4 | Status: CANCELLED | OUTPATIENT
Start: 2024-03-19

## 2024-03-19 RX ORDER — MIDAZOLAM HYDROCHLORIDE 1 MG/ML
INJECTION, SOLUTION INTRAMUSCULAR; INTRAVENOUS AS NEEDED
Status: DISCONTINUED | OUTPATIENT
Start: 2024-03-19 | End: 2024-03-19

## 2024-03-19 RX ORDER — ALBUTEROL SULFATE 0.83 MG/ML
2.5 SOLUTION RESPIRATORY (INHALATION) ONCE AS NEEDED
Status: DISCONTINUED | OUTPATIENT
Start: 2024-03-19 | End: 2024-03-19 | Stop reason: HOSPADM

## 2024-03-19 RX ORDER — PROPOFOL 10 MG/ML
INJECTION, EMULSION INTRAVENOUS AS NEEDED
Status: DISCONTINUED | OUTPATIENT
Start: 2024-03-19 | End: 2024-03-19

## 2024-03-19 RX ORDER — TIZANIDINE 4 MG/1
4 TABLET ORAL EVERY 8 HOURS PRN
Qty: 90 TABLET | Refills: 0 | Status: SHIPPED | OUTPATIENT
Start: 2024-03-19 | End: 2024-04-18

## 2024-03-19 RX ORDER — CEFOXITIN 2 G/1
INJECTION, POWDER, FOR SOLUTION INTRAVENOUS AS NEEDED
Status: DISCONTINUED | OUTPATIENT
Start: 2024-03-19 | End: 2024-03-19

## 2024-03-19 RX ORDER — ONDANSETRON HYDROCHLORIDE 2 MG/ML
INJECTION, SOLUTION INTRAVENOUS AS NEEDED
Status: DISCONTINUED | OUTPATIENT
Start: 2024-03-19 | End: 2024-03-19

## 2024-03-19 RX ORDER — HYDRALAZINE HYDROCHLORIDE 20 MG/ML
5 INJECTION INTRAMUSCULAR; INTRAVENOUS EVERY 30 MIN PRN
Status: DISCONTINUED | OUTPATIENT
Start: 2024-03-19 | End: 2024-03-19 | Stop reason: HOSPADM

## 2024-03-19 RX ORDER — ROCURONIUM BROMIDE 10 MG/ML
INJECTION, SOLUTION INTRAVENOUS AS NEEDED
Status: DISCONTINUED | OUTPATIENT
Start: 2024-03-19 | End: 2024-03-19

## 2024-03-19 RX ORDER — IPRATROPIUM BROMIDE 0.5 MG/2.5ML
500 SOLUTION RESPIRATORY (INHALATION) ONCE
Status: DISCONTINUED | OUTPATIENT
Start: 2024-03-19 | End: 2024-03-19 | Stop reason: HOSPADM

## 2024-03-19 RX ORDER — FENTANYL CITRATE 50 UG/ML
INJECTION, SOLUTION INTRAMUSCULAR; INTRAVENOUS AS NEEDED
Status: DISCONTINUED | OUTPATIENT
Start: 2024-03-19 | End: 2024-03-19

## 2024-03-19 RX ORDER — LIDOCAINE HYDROCHLORIDE 20 MG/ML
INJECTION, SOLUTION INFILTRATION; PERINEURAL AS NEEDED
Status: DISCONTINUED | OUTPATIENT
Start: 2024-03-19 | End: 2024-03-19

## 2024-03-19 RX ORDER — SODIUM CHLORIDE, SODIUM LACTATE, POTASSIUM CHLORIDE, CALCIUM CHLORIDE 600; 310; 30; 20 MG/100ML; MG/100ML; MG/100ML; MG/100ML
100 INJECTION, SOLUTION INTRAVENOUS CONTINUOUS
Status: DISCONTINUED | OUTPATIENT
Start: 2024-03-19 | End: 2024-03-19 | Stop reason: HOSPADM

## 2024-03-19 RX ADMIN — FENTANYL CITRATE 25 MCG: 0.05 INJECTION, SOLUTION INTRAMUSCULAR; INTRAVENOUS at 12:36

## 2024-03-19 RX ADMIN — SUGAMMADEX 200 MG: 100 INJECTION, SOLUTION INTRAVENOUS at 12:48

## 2024-03-19 RX ADMIN — ROCURONIUM BROMIDE 30 MG: 10 INJECTION, SOLUTION INTRAVENOUS at 12:08

## 2024-03-19 RX ADMIN — HYDROMORPHONE HYDROCHLORIDE 0.2 MG: 0.2 INJECTION, SOLUTION INTRAMUSCULAR; INTRAVENOUS; SUBCUTANEOUS at 13:18

## 2024-03-19 RX ADMIN — DEXAMETHASONE SODIUM PHOSPHATE 4 MG: 4 INJECTION INTRA-ARTICULAR; INTRALESIONAL; INTRAMUSCULAR; INTRAVENOUS; SOFT TISSUE at 12:35

## 2024-03-19 RX ADMIN — PROPOFOL 70 MG: 10 INJECTION, EMULSION INTRAVENOUS at 12:08

## 2024-03-19 RX ADMIN — CEFOXITIN 2 G: 2 INJECTION, POWDER, FOR SOLUTION INTRAVENOUS at 12:17

## 2024-03-19 RX ADMIN — LIDOCAINE HYDROCHLORIDE 50 MG: 20 INJECTION, SOLUTION INFILTRATION; PERINEURAL at 12:08

## 2024-03-19 RX ADMIN — ONDANSETRON HYDROCHLORIDE 4 MG: 2 INJECTION INTRAMUSCULAR; INTRAVENOUS at 12:35

## 2024-03-19 RX ADMIN — SODIUM CHLORIDE, POTASSIUM CHLORIDE, SODIUM LACTATE AND CALCIUM CHLORIDE: 600; 310; 30; 20 INJECTION, SOLUTION INTRAVENOUS at 12:09

## 2024-03-19 RX ADMIN — FENTANYL CITRATE 25 MCG: 0.05 INJECTION, SOLUTION INTRAMUSCULAR; INTRAVENOUS at 13:01

## 2024-03-19 RX ADMIN — IPRATROPIUM BROMIDE AND ALBUTEROL SULFATE 3 ML: 2.5; .5 SOLUTION RESPIRATORY (INHALATION) at 10:47

## 2024-03-19 RX ADMIN — MIDAZOLAM HYDROCHLORIDE 2 MG: 1 INJECTION, SOLUTION INTRAMUSCULAR; INTRAVENOUS at 12:00

## 2024-03-19 SDOH — HEALTH STABILITY: MENTAL HEALTH: CURRENT SMOKER: 1

## 2024-03-19 ASSESSMENT — PAIN - FUNCTIONAL ASSESSMENT
PAIN_FUNCTIONAL_ASSESSMENT: 0-10

## 2024-03-19 ASSESSMENT — PAIN SCALES - GENERAL
PAINLEVEL_OUTOF10: 4
PAINLEVEL_OUTOF10: 5 - MODERATE PAIN
PAINLEVEL_OUTOF10: 4
PAINLEVEL_OUTOF10: 6
PAINLEVEL_OUTOF10: 4
PAIN_LEVEL: 0
PAINLEVEL_OUTOF10: 6

## 2024-03-19 ASSESSMENT — PAIN DESCRIPTION - LOCATION: LOCATION: ABDOMEN

## 2024-03-19 ASSESSMENT — COLUMBIA-SUICIDE SEVERITY RATING SCALE - C-SSRS
6. HAVE YOU EVER DONE ANYTHING, STARTED TO DO ANYTHING, OR PREPARED TO DO ANYTHING TO END YOUR LIFE?: NO
2. HAVE YOU ACTUALLY HAD ANY THOUGHTS OF KILLING YOURSELF?: NO
1. IN THE PAST MONTH, HAVE YOU WISHED YOU WERE DEAD OR WISHED YOU COULD GO TO SLEEP AND NOT WAKE UP?: NO

## 2024-03-19 NOTE — INTERVAL H&P NOTE
H&P reviewed. The patient was examined and there are no changes to the H&P.    Cough today, getting breathing treatment

## 2024-03-19 NOTE — ANESTHESIA PREPROCEDURE EVALUATION
Patient: Olga Grajeda    Procedure Information       Date/Time: 03/19/24 1215    Procedure: Cholecystectomy Laparoscopy with Cholangiogram - C-arm /[place]ates [place] Clamp (peel pack)/Locking Handle [place]/Non-disposable transfascial suture passer    Location: [facility] 05 / [facility]    Surgeons: Juan Lugo MD            Relevant Problems   Anesthesia (within normal limits)      Cardiovascular  Cardiac clearance   (+) Hypertension      Endocrine   (+) Hypothyroidism, iatrogenic   (+) Other specified hypothyroidism      GI   (+) Gastroesophageal reflux disease      /Renal (within normal limits)      Neuro/Psych (within normal limits)      Pulmonary  Recent URI, lingering cough, lungs clear   (+) Chronic obstructive pulmonary disease (CMS/HCC)      GI/Hepatic (within normal limits)      Hematology (within normal limits)      Musculoskeletal (within normal limits)      Eyes, Ears, Nose, and Throat (within normal limits)      Infectious Disease (within normal limits)       Clinical information reviewed:   Tobacco  Allergies  Meds   Med Hx  Surg Hx  OB Status  Fam Hx  Soc   Hx        NPO Detail:  NPO/Void Status  Date of Last Liquid: 03/19/24  Time of Last Liquid: 0700  Date of Last Solid: 03/18/24  Time of Last Solid: 2100  Time of Last Void: 0800         Physical Exam    Airway  Mallampati: I  TM distance: >3 FB  Neck ROM: full     Cardiovascular - normal exam     Dental   (+) upper dentures, lower dentures     Pulmonary - normal exam     Abdominal            Anesthesia Plan    History of general anesthesia?: yes  History of complications of general anesthesia?: no    ASA 3     general     The patient is a current smoker.  Patient was previously instructed to abstain from smoking on day of procedure.  Patient smoked on day of procedure.  Education provided regarding risk of obstructive sleep apnea.  intravenous induction   Anesthetic plan and risks discussed with patient.    Plan discussed with  CRNA.

## 2024-03-19 NOTE — ANESTHESIA PROCEDURE NOTES
Airway  Date/Time: 3/19/2024 12:09 PM  Urgency: elective    Airway not difficult    Staffing  Performed: attending   Authorized by: Galo Burns MD    Performed by: ROSAURA Fraga-MANISH  Patient location during procedure: OR    Indications and Patient Condition  Indications for airway management: anesthesia and airway protection  Spontaneous Ventilation: absent  Sedation level: deep  Preoxygenated: yes  Patient position: sniffing  MILS maintained throughout  Mask difficulty assessment: 1 - vent by mask  Planned trial extubation    Final Airway Details  Final airway type: endotracheal airway      Successful airway: ETT  Cuffed: yes   Successful intubation technique: direct laryngoscopy  Facilitating devices/methods: intubating stylet  Endotracheal tube insertion site: oral  Blade: [place]  Blade size: #3  ETT size (mm): 7.0  Cormack-Lehane Classification: grade I - full view of glottis  Placement verified by: chest auscultation, capnometry and palpation of cuff   Cuff volume (mL): 8  Measured from: teeth  ETT to teeth (cm): 20  Number of attempts at approach: 1  Ventilation between attempts: none  Number of other approaches attempted: 0    Additional Comments  No change to oral cavity

## 2024-03-19 NOTE — OP NOTE
Cholecystectomy Laparoscopy with Cholangiogram Operative Note     Date: 3/19/2024  OR Location: [facility]    Name: Olga Grajeda, : 1960, Age: 63 y.o., MRN: [id number], Sex: female    Diagnosis  Pre-op Diagnosis     * Cholecystitis [K81.9] Post-op Diagnosis     * Cholecystitis [K81.9]     Procedures  Cholecystectomy Laparoscopy with Cholangiogram  35285 - HI LAPS SURG CHOLECYSTECTOMY W/CHOLANGIOGRAPHY      Surgeons      * Juan Lugo - Primary    Resident/Fellow/Other Assistant:  Surgeon(s) and Role:    Procedure Summary  Anesthesia: General  ASA: III  Anesthesia Staff: Anesthesiologist: Galo Burns MD  CRNA: ROSAURA Fraga-CRNA  Estimated Blood Loss: 10mL  Intra-op Medications: Administrations occurring from 1215 to 1345 on 24:  * No intraprocedure medications in log *           Anesthesia Record               Intraprocedure I/O Totals       None           Specimen:   ID Type Source Tests Collected by Time   1 : gallbladder Tissue GALLBLADDER CHOLECYSTECTOMY SURGICAL PATHOLOGY EXAM Juan Lugo MD 3/19/2024 1243        Staff:   Circulator: Paulie Pacheco RN  Relief Circulator: Beau Alvarez RN  Scrub Person: Erum Thompson         Drains and/or Catheters: * None in log *    Tourniquet Times:         Implants:     Findings: mildly dilated common duct without filling defects    Indications: Olga Grajeda is an 63 y.o. female who is having surgery for Cholecystitis [K81.9].     The patient was seen in the preoperative area. The risks, benefits, complications, treatment options, non-operative alternatives, expected recovery and outcomes were discussed with the patient. The possibilities of reaction to medication, pulmonary aspiration, injury to surrounding structures, bleeding, recurrent infection, the need for additional procedures, failure to diagnose a condition, and creating a complication requiring transfusion or operation were discussed with the patient. The patient  concurred with the proposed plan, giving informed consent.  The site of surgery was properly noted/marked if necessary per policy. The patient has been actively warmed in preoperative area. Preoperative antibiotics have been ordered and given within 1 hours of incision. Venous thrombosis prophylaxis have been ordered including bilateral sequential compression devices    Procedure Details: Risks, benefits, and alternatives to laparoscopic cholecystectomy with intraoperative cholangiography were described. Risks including but not limited to bleeding, infection, bile leak or retained stone, need for endoscopic retrograde cholangiopancreatography, common duct injury, need for secondary procedures and related procedures. Patient understands risks and agrees to proceed.    Consent was obtained.  Patient was brought to the operating room suite.  Time-out was performed.  Patient's place the supine position and general anesthesia was induced.  The abdomen is prepped and draped in the usual fashion.  40 milliliters of 0.25% Marcaine were used to infiltrate all trocar sites.  5 millimeter optical trocar to place a left upper quadrant under optical visualization.  Pneumoperitoneum was achieved.  The entry site was inspected and found no evidence of injury.  An 11 millimeter trocars placed the infraumbilical region.  Two 5 millimeter trocars placed in right upper quadrant.  The gallbladder is grasped retracted laterally and cephalad.  The medial lateral attachments to gallbladder open with Bovie electrocautery revealing a critical view of a single cystic duct and single cystic artery.  A 5 millimeter clip applier was used to doubly clip and divide the artery.  A 5 millimeter clip was placed at the junction of the cystic duct in the infundibulum.  Micro scissors were used to perform a cystic ductotomy.  Cholangiogram catheter was then placed through the cystic ductotomy.  Cholangiography revealed a bilateral intrahepatic ductal  filling and prompt emptying into the duodenum.  There were no luminal defects consistent with stones or strictures.  The catheter was removed and this completed the cholangiography.  5 millimeter clips were then placed on the duct stump.  The gallbladder was removed from the liver bed using Bovie electric cautery.  The gallbladder was brought through the umbilical trocar.  There was minimal spillage of bile and no spillage of stones.        The umbilical port site was then closed with a Ryan-Larry suture Passer with an 0 Vicryl.  All skin was closed with 4 Monocryl and Dermabond.    Patient tolerated the procedure well and was extubated and  transferred to PACU in good condition for anticipated same-day discharge.  Complications:  None; patient tolerated the procedure well.    Disposition: PACU - hemodynamically stable.  Condition: stable         Additional Details:     Attending Attestation: I performed the procedure.    Juan Lugo  Phone Number: [phone]

## 2024-03-19 NOTE — POST-PROCEDURE NOTE
1403- pt brought back from pacu,verbal report received. Pt is alert and awake.  at bedside.     1407- pt ambulated to BR with steady gait and assistance of this nurse.     1430- vss. Discharge education given and explained to pt and . Both verbally understood.     1434- pt getting dressed at this time with assistance of .     1444- transport at bedside.

## 2024-03-19 NOTE — ANESTHESIA POSTPROCEDURE EVALUATION
Patient: Olga Grajeda    Procedure Summary       Date: 03/19/24 Room / Location: [facility] 05 / [facility]    Anesthesia Start: 1202 Anesthesia Stop: 1301    Procedure: Cholecystectomy Laparoscopy with Cholangiogram Diagnosis:       Cholecystitis      (Cholecystitis [K81.9])    Surgeons: Juan Lugo MD Responsible Provider: Galo Burns MD    Anesthesia Type: general ASA Status: 3            Anesthesia Type: general    Vitals Value Taken Time   /82 03/19/24 1346   Temp 36 °C (96.8 °F) 03/19/24 1304   Pulse 74 03/19/24 1347   Resp 23 03/19/24 1347   SpO2 93 % 03/19/24 1347   Vitals shown include unvalidated device data.    Anesthesia Post Evaluation    Patient location during evaluation: PACU  Patient participation: complete - patient participated  Level of consciousness: awake  Pain score: 0  Pain management: adequate  Multimodal analgesia pain management approach  Airway patency: patent  Two or more strategies used to mitigate risk of obstructive sleep apnea  Cardiovascular status: acceptable  Respiratory status: acceptable  Hydration status: acceptable  Postoperative Nausea and Vomiting: none        There were no known notable events for this encounter.

## 2024-03-20 RX ORDER — LEVOTHYROXINE SODIUM 75 UG/1
TABLET ORAL
Qty: 30 TABLET | Refills: 3 | Status: SHIPPED | OUTPATIENT
Start: 2024-03-20

## 2024-03-21 ENCOUNTER — APPOINTMENT (OUTPATIENT)
Dept: SURGERY | Facility: CLINIC | Age: 64
End: 2024-03-21
Payer: COMMERCIAL

## 2024-03-21 LAB
LABORATORY COMMENT REPORT: NORMAL
PATH REPORT.FINAL DX SPEC: NORMAL
PATH REPORT.GROSS SPEC: NORMAL
PATH REPORT.RELEVANT HX SPEC: NORMAL
PATH REPORT.TOTAL CANCER: NORMAL

## 2024-04-01 ENCOUNTER — OFFICE VISIT (OUTPATIENT)
Dept: SURGERY | Facility: CLINIC | Age: 64
End: 2024-04-01
Payer: COMMERCIAL

## 2024-04-01 VITALS
SYSTOLIC BLOOD PRESSURE: 140 MMHG | HEART RATE: 92 BPM | OXYGEN SATURATION: 97 % | BODY MASS INDEX: 14 KG/M2 | DIASTOLIC BLOOD PRESSURE: 80 MMHG | WEIGHT: 82 LBS | HEIGHT: 64 IN | TEMPERATURE: 97.9 F

## 2024-04-01 DIAGNOSIS — Z09 POSTOPERATIVE EXAMINATION: Primary | ICD-10-CM

## 2024-04-01 DIAGNOSIS — Z53.20 COLON CANCER SCREENING DECLINED: ICD-10-CM

## 2024-04-01 PROCEDURE — 3077F SYST BP >= 140 MM HG: CPT | Performed by: SURGERY

## 2024-04-01 PROCEDURE — 3079F DIAST BP 80-89 MM HG: CPT | Performed by: SURGERY

## 2024-04-01 PROCEDURE — 99024 POSTOP FOLLOW-UP VISIT: CPT | Performed by: SURGERY

## 2024-04-01 ASSESSMENT — PAIN SCALES - GENERAL: PAINLEVEL: 3

## 2024-04-01 NOTE — PROGRESS NOTES
"Subjective   Olga Grajeda presents to the clinic 2 weeks following lap sienna with IOC. Eating a regular diet without difficulty. Bowel movements are Normal. Pain is controlled without any medications.  Patient is still complaining about upper abdominal pain.    Objective   /80 (BP Location: Left arm, Patient Position: Sitting)   Pulse 92   Temp 36.6 °C (97.9 °F)   Ht 1.613 m (5' 3.5\")   Wt (!) 37.2 kg (82 lb)   SpO2 97%   BMI 14.30 kg/m²   General:  alert and oriented, in no acute distress   Abdomen: soft, bowel sounds active, non-tender   Incision:  healing well, no drainage, no erythema, no hernia, no seroma, no swelling, no dehiscence, incision well approximated     Assessment/Plan   Doing well postoperatively.    1. Continue any current medications.  2. Wound care discussed.  3. Pt is to increase activities as tolerated.  4.  Patient had an EGD with Dr. Sanches, but refused a colonoscopy with him.  We offered her colonoscopy as well and would be willing to schedule that for her, but she declines colonoscopy from our practice.  "

## 2024-04-04 ENCOUNTER — PROCEDURE VISIT (OUTPATIENT)
Dept: UROLOGY | Facility: CLINIC | Age: 64
End: 2024-04-04
Payer: COMMERCIAL

## 2024-04-04 VITALS
HEART RATE: 98 BPM | HEIGHT: 64 IN | WEIGHT: 82 LBS | TEMPERATURE: 98 F | SYSTOLIC BLOOD PRESSURE: 154 MMHG | DIASTOLIC BLOOD PRESSURE: 84 MMHG | BODY MASS INDEX: 14 KG/M2

## 2024-04-04 DIAGNOSIS — C67.9 MALIGNANT NEOPLASM OF URINARY BLADDER, UNSPECIFIED SITE (MULTI): Primary | ICD-10-CM

## 2024-04-04 LAB
POC APPEARANCE, URINE: CLEAR
POC BILIRUBIN, URINE: NEGATIVE
POC BLOOD, URINE: ABNORMAL
POC COLOR, URINE: YELLOW
POC GLUCOSE, URINE: NEGATIVE MG/DL
POC KETONES, URINE: NEGATIVE MG/DL
POC LEUKOCYTES, URINE: NEGATIVE
POC NITRITE,URINE: NEGATIVE
POC PH, URINE: 7 PH
POC PROTEIN, URINE: ABNORMAL MG/DL
POC SPECIFIC GRAVITY, URINE: 1.02
POC UROBILINOGEN, URINE: 0.2 EU/DL

## 2024-04-04 PROCEDURE — 88112 CYTOPATH CELL ENHANCE TECH: CPT

## 2024-04-04 PROCEDURE — 81003 URINALYSIS AUTO W/O SCOPE: CPT | Performed by: STUDENT IN AN ORGANIZED HEALTH CARE EDUCATION/TRAINING PROGRAM

## 2024-04-04 PROCEDURE — 52214 CYSTOSCOPY AND TREATMENT: CPT | Performed by: STUDENT IN AN ORGANIZED HEALTH CARE EDUCATION/TRAINING PROGRAM

## 2024-04-04 PROCEDURE — 88112 CYTOPATH CELL ENHANCE TECH: CPT | Performed by: PATHOLOGY

## 2024-04-04 RX ORDER — CEPHALEXIN 500 MG/1
500 CAPSULE ORAL 2 TIMES DAILY
Qty: 4 CAPSULE | Refills: 0 | Status: SHIPPED | OUTPATIENT
Start: 2024-04-04 | End: 2024-04-06

## 2024-04-04 ASSESSMENT — PAIN SCALES - GENERAL: PAINLEVEL: 0-NO PAIN

## 2024-04-04 NOTE — PROGRESS NOTES
HPI:  Proc (9/19/23): TURBT   Path: non-invasive papillary urothelial carcinoma, low-grade     63 year old female self-referred for bladder mass. Hx of HTN. Patient is a smoker. Recently seen in the ED (8/8/23) for upper abdominal pain, hematuria, and dysuria. CT AP (8/9/23) showed no evidence of acute abnormality in the abdomen/pelvis, partially visualized emphysematous lung changes, there is a 1.2 cm nodularity in the right posterior dependent portion of the bladder without associated bladder wall thickening, which is nonspecific. S/p TURBT (9/19/23) with pathology showing non-invasive papillary urothelial carcinoma, low-grade. Presents for cystoscopy. Good urinary function. Denies hematuria. Doing well.      Smoker   Hx: HTN  SHx: thyroidectomy (3/22)   FHx: father (cause of death) and brother had bladder cancer, both worked with chemicals.      CT AP (8/9/23): no evidence of acute abnormality in the abdomen/pelvis, partially visualized emphysematous lung changes, there is a 1.2 cm nodularity in the right posterior dependent portion of the bladder without associated bladder wall thickening, which is nonspecific.     Review of Systems:  All systems reviewed. Anything negative noted in the HPI.    Physical Exam:  Vitals signs reviewed.  Constitutional:      Appearance: Well-developed.  HENT:     Head: Normocephalic and atraumatic.  Neck:     Musculoskeletal: Normal range of motion.  Pulmonary:     Effort: Pulmonary effort is normal.  Musculoskeletal: Normal range of motion.  Skin:     General: Skin is warm and dry.  Neurological:     Mental Status: Alert and oriented to person, place, and time.  Psychiatric:        Behavior: Behavior normal.        Thought Content: Thought content normal.        Judgment: Judgment normal.    Cystoscopy    Date/Time: 4/4/2024 9:40 AM    Performed by: Idris Gutierrez MD  Authorized by: Idris Gutierrez MD    Procedure - Bladder Cystoscopy:     Procedure details:  fulguration    Post-procedure:     Patient tolerance: Patient tolerated the procedure well with no immediate complications      Comments:      Some inflammation at bladder base, tiny papillary lesion along left posterior wall, fulgurated.       Assessment/Plan   63 year old female self-referred for bladder mass. Hx of HTN. Patient is a smoker. Recently seen in the ED (8/8/23) for upper abdominal pain, hematuria, and dysuria. CT AP (8/9/23) showed no evidence of acute abnormality in the abdomen/pelvis, partially visualized emphysematous lung changes, there is a 1.2 cm nodularity in the right posterior dependent portion of the bladder without associated bladder wall thickening, which is nonspecific. S/p TURBT (9/19/23) with pathology showing non-invasive papillary urothelial carcinoma, low-grade. Good urinary function. Denies hematuria. Doing well. Management options including risks, benefits and alternatives discussed at length and all questions answered. Patient prefers to proceed with follow-up in 3mos for cystoscopy.             Scribe Attestation  By signing my name below, IMaddison Scribe   attest that this documentation has been prepared under the direction and in the presence of Idris Gutierrez MD.

## 2024-04-25 DIAGNOSIS — I10 PRIMARY HYPERTENSION: ICD-10-CM

## 2024-04-26 RX ORDER — AMLODIPINE BESYLATE 5 MG/1
5 TABLET ORAL DAILY
Qty: 90 TABLET | Refills: 1 | Status: SHIPPED | OUTPATIENT
Start: 2024-04-26

## 2024-05-09 ENCOUNTER — TELEPHONE (OUTPATIENT)
Dept: GASTROENTEROLOGY | Facility: HOSPITAL | Age: 64
End: 2024-05-09
Payer: COMMERCIAL

## 2024-05-09 NOTE — TELEPHONE ENCOUNTER
Patient calling in to schedule an appt with Dr. Barrera, advised of referral/ clinical documentation needed for review

## 2024-05-30 DIAGNOSIS — R06.00 DYSPNEA, UNSPECIFIED TYPE: ICD-10-CM

## 2024-05-30 DIAGNOSIS — Z72.0 TOBACCO USE: Primary | ICD-10-CM

## 2024-05-31 RX ORDER — FLUTICASONE FUROATE, UMECLIDINIUM BROMIDE AND VILANTEROL TRIFENATATE 100; 62.5; 25 UG/1; UG/1; UG/1
1 POWDER RESPIRATORY (INHALATION) DAILY
Qty: 60 EACH | Refills: 1 | Status: SHIPPED | OUTPATIENT
Start: 2024-05-31

## 2024-06-12 DIAGNOSIS — E89.0 STATUS POST TOTAL THYROIDECTOMY: ICD-10-CM

## 2024-06-13 RX ORDER — LEVOTHYROXINE SODIUM 75 UG/1
TABLET ORAL
Qty: 90 TABLET | Refills: 1 | Status: SHIPPED | OUTPATIENT
Start: 2024-06-13

## 2024-06-19 ENCOUNTER — APPOINTMENT (OUTPATIENT)
Dept: GASTROENTEROLOGY | Facility: HOSPITAL | Age: 64
End: 2024-06-19
Payer: COMMERCIAL

## 2024-06-27 ENCOUNTER — APPOINTMENT (OUTPATIENT)
Dept: PRIMARY CARE | Facility: CLINIC | Age: 64
End: 2024-06-27
Payer: COMMERCIAL

## 2024-07-02 ENCOUNTER — APPOINTMENT (OUTPATIENT)
Dept: ENDOCRINOLOGY | Facility: CLINIC | Age: 64
End: 2024-07-02
Payer: COMMERCIAL

## 2024-07-02 DIAGNOSIS — E89.0 STATUS POST TOTAL THYROIDECTOMY: Primary | ICD-10-CM

## 2024-07-02 DIAGNOSIS — E55.9 VITAMIN D DEFICIENCY: ICD-10-CM

## 2024-07-02 PROCEDURE — 99213 OFFICE O/P EST LOW 20 MIN: CPT | Performed by: STUDENT IN AN ORGANIZED HEALTH CARE EDUCATION/TRAINING PROGRAM

## 2024-07-02 RX ORDER — LEVOTHYROXINE SODIUM 75 UG/1
TABLET ORAL
Qty: 90 TABLET | Refills: 3 | Status: SHIPPED | OUTPATIENT
Start: 2024-07-02

## 2024-07-02 NOTE — PROGRESS NOTES
Subjective   Olga Grajeda is a 63 y.o. female with hx of MNG s/p thyroidectomy in March coming in for follow up.  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 Pathology showed follicular adenoma.  Had repeat blood work in April and TSH was 5.55 LT4 was increased to 75 mcg daily  Initially was on calcium 1000 mg TID weaned down to 1000 mg once daily but not taking it now  Stopped vitamin D 2000IU  Last seen in June 2023  Has bladder cancer s/p resection and ablation following every 3 months  Had cholecystectomy in March  Complains of Tightness at her upper epigastric  Currently taking synthroid 75 mcg daily appropriately      When she had hyperthyroid had dry skin now moist  Had a virus in March and couldn't eat and had cholecystectomy so she lost some weight about 10 lbs. Now weighs 87 (recently gained around)  Energy: Okay  Sleep: Not sleeping well (has so many issues family members sick and chest tightness)  BM: On occasion constipation   No hot flashes or night sweats  No cold or heat intolerance  No palpitations  Some tremors (one or twice a day)     Took Nexium and famotidine    Latest Reference Range & Units Most Recent 09/16/22 11:30 04/04/23 09:39 06/13/23 13:45   Parathyroid Hormone, Intact 18.5 - 88.0 pg/mL 41.2  9/16/22 11:30 41.2     Thyroxine, Free 0.78 - 1.48 ng/dL 1.25  6/13/23 13:45 1.36 1.14 1.25   Triiodothyronine 60 - 200 ng/dL 81  4/4/23 09:39  81    Thyroid Stimulating Hormone 0.44 - 3.98 mIU/L 3.12  6/13/23 13:45 2.64 5.55 (H) 3.12   Vitamin D, 25-Hydroxy, Total ng/mL 34  6/13/23 13:45 56  34   Triiodothyronine, Free 2.3 - 4.2 pg/mL 4.6 (H)  9/2/21 14:52      (H): Data is abnormally high    Review of Systems  all pertinent systems reviewed and are otherwise negative   Objective   Not done   Physical Exam  No acute distress  Virtual visit  Lab Results   Component Value Date    TSH 3.12 06/13/2023    FREET4 1.25 06/13/2023       Latest Reference Range & Units Most Recent 09/16/22 11:30 04/04/23 09:39 06/13/23 13:45   Parathyroid Hormone, Intact 18.5 - 88.0 pg/mL 41.2  9/16/22 11:30 41.2     Thyroxine, Free 0.78 - 1.48 ng/dL 1.25  6/13/23 13:45 1.36 1.14 1.25   Triiodothyronine 60 - 200 ng/dL 81  4/4/23 09:39  81    Thyroid Stimulating Hormone 0.44 - 3.98 mIU/L 3.12  6/13/23 13:45 2.64 5.55 (H) 3.12   Vitamin D, 25-Hydroxy, Total ng/mL 34  6/13/23 13:45 56  34   Triiodothyronine, Free 2.3 - 4.2 pg/mL 4.6 (H)  9/2/21 14:52      (H): Data is abnormally high    Assessment/Plan   Olga Grajeda is a 63 y.o. female with hx of MNG s/p thyroidectomy in March coming in for follow up.  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 Pathology showed follicular adenoma.  Had repeat blood work in April and TSH was 5.55 LT4 was increased to 75 mcg daily  Initially was on calcium 1000 mg TID weaned down to 1000 mg once daily but not taking it now  Stopped vitamin D 2000IU  Last seen in June 2023  Has bladder cancer s/p resection and ablation following every 3 months  Had cholecystectomy in March  Complains of Tightness at her upper epigastric  Currently taking synthroid 75 mcg daily appropriately      Had a virus in March and couldn't eat and had cholecystectomy so she lost some weight about 10 lbs. Now weighs 87 (recently gained around)    Plan:  Continue Levothyroxine 75 mcg daily  to be taken on an empty stomach on its own 30min before other meds or food   Blood work including TFTs, Vitamin D and RFP    [facility] in 6-8 months virtual

## 2024-07-18 ENCOUNTER — APPOINTMENT (OUTPATIENT)
Dept: UROLOGY | Facility: CLINIC | Age: 64
End: 2024-07-18
Payer: COMMERCIAL

## 2024-07-18 ENCOUNTER — LAB (OUTPATIENT)
Dept: LAB | Facility: LAB | Age: 64
End: 2024-07-18
Payer: COMMERCIAL

## 2024-07-18 VITALS
HEART RATE: 73 BPM | SYSTOLIC BLOOD PRESSURE: 153 MMHG | BODY MASS INDEX: 15.41 KG/M2 | DIASTOLIC BLOOD PRESSURE: 91 MMHG | TEMPERATURE: 97.3 F | HEIGHT: 63 IN | WEIGHT: 87 LBS

## 2024-07-18 DIAGNOSIS — E89.0 STATUS POST TOTAL THYROIDECTOMY: ICD-10-CM

## 2024-07-18 DIAGNOSIS — E55.9 VITAMIN D DEFICIENCY: ICD-10-CM

## 2024-07-18 DIAGNOSIS — C67.9 MALIGNANT NEOPLASM OF URINARY BLADDER, UNSPECIFIED SITE (MULTI): Primary | ICD-10-CM

## 2024-07-18 LAB
25(OH)D3 SERPL-MCNC: 26 NG/ML (ref 30–100)
ALBUMIN SERPL BCP-MCNC: 4.7 G/DL (ref 3.4–5)
ANION GAP SERPL CALC-SCNC: 12 MMOL/L (ref 10–20)
BUN SERPL-MCNC: 8 MG/DL (ref 6–23)
CALCIUM SERPL-MCNC: 9.4 MG/DL (ref 8.6–10.6)
CHLORIDE SERPL-SCNC: 100 MMOL/L (ref 98–107)
CO2 SERPL-SCNC: 31 MMOL/L (ref 21–32)
CREAT SERPL-MCNC: 0.58 MG/DL (ref 0.5–1.05)
EGFRCR SERPLBLD CKD-EPI 2021: >90 ML/MIN/1.73M*2
GLUCOSE SERPL-MCNC: 102 MG/DL (ref 74–99)
PHOSPHATE SERPL-MCNC: 4 MG/DL (ref 2.5–4.9)
POC APPEARANCE, URINE: CLEAR
POC BILIRUBIN, URINE: NEGATIVE
POC BLOOD, URINE: ABNORMAL
POC COLOR, URINE: YELLOW
POC GLUCOSE, URINE: NEGATIVE MG/DL
POC KETONES, URINE: NEGATIVE MG/DL
POC LEUKOCYTES, URINE: NEGATIVE
POC NITRITE,URINE: NEGATIVE
POC PH, URINE: 7 PH
POC PROTEIN, URINE: NEGATIVE MG/DL
POC SPECIFIC GRAVITY, URINE: 1.01
POC UROBILINOGEN, URINE: 0.2 EU/DL
POTASSIUM SERPL-SCNC: 3.8 MMOL/L (ref 3.5–5.3)
SODIUM SERPL-SCNC: 139 MMOL/L (ref 136–145)
T4 FREE SERPL-MCNC: 1.39 NG/DL (ref 0.78–1.48)
TSH SERPL-ACNC: 1.59 MIU/L (ref 0.44–3.98)

## 2024-07-18 PROCEDURE — 80069 RENAL FUNCTION PANEL: CPT

## 2024-07-18 PROCEDURE — 36415 COLL VENOUS BLD VENIPUNCTURE: CPT

## 2024-07-18 PROCEDURE — 99213 OFFICE O/P EST LOW 20 MIN: CPT | Performed by: STUDENT IN AN ORGANIZED HEALTH CARE EDUCATION/TRAINING PROGRAM

## 2024-07-18 PROCEDURE — 84439 ASSAY OF FREE THYROXINE: CPT

## 2024-07-18 PROCEDURE — 82306 VITAMIN D 25 HYDROXY: CPT

## 2024-07-18 PROCEDURE — 81003 URINALYSIS AUTO W/O SCOPE: CPT | Performed by: STUDENT IN AN ORGANIZED HEALTH CARE EDUCATION/TRAINING PROGRAM

## 2024-07-18 PROCEDURE — 84443 ASSAY THYROID STIM HORMONE: CPT

## 2024-07-18 PROCEDURE — 52224 CYSTOSCOPY AND TREATMENT: CPT | Performed by: STUDENT IN AN ORGANIZED HEALTH CARE EDUCATION/TRAINING PROGRAM

## 2024-07-18 RX ORDER — CEPHALEXIN 500 MG/1
500 CAPSULE ORAL 2 TIMES DAILY
Qty: 2 CAPSULE | Refills: 0 | Status: SHIPPED | OUTPATIENT
Start: 2024-07-18 | End: 2024-07-19

## 2024-07-18 ASSESSMENT — PAIN SCALES - GENERAL: PAINLEVEL: 0-NO PAIN

## 2024-07-18 NOTE — PROGRESS NOTES
HPI:  Proc (9/19/23): TURBT   Path: non-invasive papillary urothelial carcinoma, low-grade     63 year old female self-referred for bladder mass. Hx of HTN. CT AP (8/9/23) showed no evidence of acute abnormality in the abdomen/pelvis, partially visualized emphysematous lung changes, there is a 1.2 cm nodularity in the right posterior dependent portion of the bladder without associated bladder wall thickening, which is nonspecific. S/p TURBT (9/19/23) with pathology showing non-invasive papillary urothelial carcinoma, low-grade. Presents for cystoscopy. Doing well.      Smoker   Hx: HTN  SHx: thyroidectomy (3/22)   FHx: father (cause of death) and brother had bladder cancer, both worked with chemicals.      CT AP (8/9/23): no evidence of acute abnormality in the abdomen/pelvis, partially visualized emphysematous lung changes, there is a 1.2 cm nodularity in the right posterior dependent portion of the bladder without associated bladder wall thickening, which is nonspecific.     Review of Systems:  All systems reviewed. Anything negative noted in the HPI.    Physical Exam:  Vitals signs reviewed.  Constitutional:      Appearance: Well-developed.  HENT:     Head: Normocephalic and atraumatic.  Neck:     Musculoskeletal: Normal range of motion.  Pulmonary:     Effort: Pulmonary effort is normal.  Musculoskeletal: Normal range of motion.  Skin:     General: Skin is warm and dry.  Neurological:     Mental Status: Alert and oriented to person, place, and time.  Psychiatric:        Behavior: Behavior normal.        Thought Content: Thought content normal.        Judgment: Judgment normal.    Cystoscopy    Date/Time: 7/18/2024 12:12 PM    Performed by: Idris Gutierrez MD  Authorized by: Idris Gutierrez MD    Procedure - Bladder Cystoscopy:     Procedure details: cystoscopy and fulguration    Post-procedure:     Patient tolerance: Patient tolerated the procedure well with no immediate complications      Comments:      Old  scar, four papillary lesions, all consistent with low-grade urothelial carcinoma, all fulgurated       Assessment/Plan   63 year old female self-referred for bladder mass. Hx of HTN. CT AP (8/9/23) showed no evidence of acute abnormality in the abdomen/pelvis, partially visualized emphysematous lung changes, there is a 1.2 cm nodularity in the right posterior dependent portion of the bladder without associated bladder wall thickening, which is nonspecific. S/p TURBT (9/19/23) with pathology showing non-invasive papillary urothelial carcinoma, low-grade. Doing well. Management options including risks, benefits and alternatives discussed at length and all questions answered. Patient prefers to proceed with follow-up in 3mos for cystoscopy.     I discussed intravesical therapy with patient, she declined at this time.         Scribe Attestation  By signing my name below, IMaddison Scribe   attest that this documentation has been prepared under the direction and in the presence of Idris Gutierrez MD.

## 2024-07-26 DIAGNOSIS — I10 HYPERTENSION, UNSPECIFIED TYPE: ICD-10-CM

## 2024-07-26 DIAGNOSIS — Z72.0 TOBACCO USE: ICD-10-CM

## 2024-07-26 DIAGNOSIS — R06.00 DYSPNEA, UNSPECIFIED TYPE: ICD-10-CM

## 2024-07-26 RX ORDER — FLUTICASONE FUROATE, UMECLIDINIUM BROMIDE AND VILANTEROL TRIFENATATE 100; 62.5; 25 UG/1; UG/1; UG/1
1 POWDER RESPIRATORY (INHALATION) DAILY
Qty: 60 EACH | Refills: 3 | Status: SHIPPED | OUTPATIENT
Start: 2024-07-26

## 2024-07-26 RX ORDER — LOSARTAN POTASSIUM 50 MG/1
50 TABLET ORAL 2 TIMES DAILY
Qty: 180 TABLET | Refills: 3 | Status: SHIPPED | OUTPATIENT
Start: 2024-07-26 | End: 2025-07-26

## 2024-08-08 ENCOUNTER — APPOINTMENT (OUTPATIENT)
Dept: PRIMARY CARE | Facility: CLINIC | Age: 64
End: 2024-08-08
Payer: COMMERCIAL

## 2024-08-12 DIAGNOSIS — E55.9 VITAMIN D DEFICIENCY: ICD-10-CM

## 2024-08-12 RX ORDER — ERGOCALCIFEROL (VITAMIN D2) 50 MCG
2000 CAPSULE ORAL DAILY
Qty: 90 CAPSULE | Refills: 3 | Status: SHIPPED | OUTPATIENT
Start: 2024-08-12 | End: 2025-08-12

## 2024-09-24 ENCOUNTER — APPOINTMENT (OUTPATIENT)
Dept: ENDOCRINOLOGY | Facility: HOSPITAL | Age: 64
End: 2024-09-24
Payer: COMMERCIAL

## 2024-10-15 ENCOUNTER — APPOINTMENT (OUTPATIENT)
Dept: GASTROENTEROLOGY | Facility: HOSPITAL | Age: 64
End: 2024-10-15
Payer: COMMERCIAL

## 2024-10-23 ENCOUNTER — APPOINTMENT (OUTPATIENT)
Dept: UROLOGY | Facility: CLINIC | Age: 64
End: 2024-10-23
Payer: COMMERCIAL

## 2024-10-23 VITALS
WEIGHT: 95.8 LBS | TEMPERATURE: 98.6 F | DIASTOLIC BLOOD PRESSURE: 98 MMHG | SYSTOLIC BLOOD PRESSURE: 199 MMHG | BODY MASS INDEX: 16.97 KG/M2 | HEART RATE: 91 BPM | HEIGHT: 63 IN

## 2024-10-23 DIAGNOSIS — C67.9 MALIGNANT NEOPLASM OF URINARY BLADDER, UNSPECIFIED SITE (MULTI): Primary | ICD-10-CM

## 2024-10-23 LAB
POC APPEARANCE, URINE: CLEAR
POC BILIRUBIN, URINE: NEGATIVE
POC BLOOD, URINE: ABNORMAL
POC COLOR, URINE: YELLOW
POC GLUCOSE, URINE: NEGATIVE MG/DL
POC KETONES, URINE: NEGATIVE MG/DL
POC LEUKOCYTES, URINE: NEGATIVE
POC NITRITE,URINE: NEGATIVE
POC PH, URINE: 7.5 PH
POC PROTEIN, URINE: NEGATIVE MG/DL
POC SPECIFIC GRAVITY, URINE: 1.01
POC UROBILINOGEN, URINE: 0.2 EU/DL

## 2024-10-23 PROCEDURE — 99213 OFFICE O/P EST LOW 20 MIN: CPT | Performed by: STUDENT IN AN ORGANIZED HEALTH CARE EDUCATION/TRAINING PROGRAM

## 2024-10-23 PROCEDURE — 88112 CYTOPATH CELL ENHANCE TECH: CPT | Performed by: PATHOLOGY

## 2024-10-23 PROCEDURE — 81003 URINALYSIS AUTO W/O SCOPE: CPT | Performed by: STUDENT IN AN ORGANIZED HEALTH CARE EDUCATION/TRAINING PROGRAM

## 2024-10-23 PROCEDURE — 88112 CYTOPATH CELL ENHANCE TECH: CPT

## 2024-10-23 PROCEDURE — 52224 CYSTOSCOPY AND TREATMENT: CPT | Performed by: STUDENT IN AN ORGANIZED HEALTH CARE EDUCATION/TRAINING PROGRAM

## 2024-10-23 ASSESSMENT — PAIN SCALES - GENERAL: PAINLEVEL_OUTOF10: 0-NO PAIN

## 2024-10-24 LAB
LABORATORY COMMENT REPORT: NORMAL
LABORATORY COMMENT REPORT: NORMAL
PATH REPORT.FINAL DX SPEC: NORMAL
PATH REPORT.GROSS SPEC: NORMAL
PATH REPORT.RELEVANT HX SPEC: NORMAL
PATH REPORT.TOTAL CANCER: NORMAL
RESIDENT REVIEW: NORMAL

## 2024-10-27 DIAGNOSIS — I10 PRIMARY HYPERTENSION: ICD-10-CM

## 2024-10-28 RX ORDER — AMLODIPINE BESYLATE 5 MG/1
5 TABLET ORAL DAILY
Qty: 90 TABLET | Refills: 1 | Status: SHIPPED | OUTPATIENT
Start: 2024-10-28

## 2024-11-13 ENCOUNTER — APPOINTMENT (OUTPATIENT)
Dept: ENDOCRINOLOGY | Facility: CLINIC | Age: 64
End: 2024-11-13
Payer: COMMERCIAL

## 2024-11-24 DIAGNOSIS — R06.00 DYSPNEA, UNSPECIFIED TYPE: ICD-10-CM

## 2024-11-24 DIAGNOSIS — Z72.0 TOBACCO USE: ICD-10-CM

## 2024-11-24 NOTE — PROGRESS NOTES
Gastroenterology Clinic Consult Note  Abdominal pain    Reason For Consult  64-year-old woman who is here today in regards to evaluation of abdominal pain.      History Of Present Illness  Olga Grajeda is a 64 y.o. female here for evaluation of abdominal pain.  I have reviewed records from [place] [facility] as well as from the [facility].  Patient has had chronic postprandial abdominal pain.  Upper endoscopy in 11/23 was notable for a hiatal hernia and at that time small bowel biopsies were negative.  Mild gastritis was noted on the biopsies of the stomach without evidence of h.pylori.  She did have a HIDA scan with gallbladder ejection fraction which was slightly low at 37%.  Patient subsequently underwent a laparoscopic cholecystectomy in March of this year.  Pathology showed a mild chronic cholecysitis.   Liver function tests had been normal.  She is also been seen by Dr. 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  She was prescribed BuSpar and a proton pump inhibitor.  She has not had a colonoscopy.    Patient describes the pain as constant in her upper abdomen that radiates to both sides.  The pain does get worse sometimes after eating and during stressful times.  There is occasional nausea but no vomiting.  She notes some weight loss after having a viral illness but has gained that back and overall has gained weight since the symptoms started.  She denies any factors which seem to exacerbate or relieve the symptoms.  She does note the symptoms seem to start after being on losartan for her blood pressure.  Her bowels move regularly.  Patient is not interested in having a colonoscopy.  She continues to smoke daily.  There is no dysphagia.  She denies any severe heartburn symptoms.  She is frustrated with the ongoing symptoms.  We reviewed her intraoperative cholangiogram findings.     Past  Medical History  She has a past medical history of Bladder cancer (Multi) (2023), Disease of thyroid gland, Disorder of thyroid, unspecified (11/03/2021), Hypertension, and Thyroid nodule.    Surgical History  She has a past surgical history that includes Total thyroidectomy (03/2022); Bladder tumor excision (2023); Thyroid surgery; and Cholecystectomy (03/19/2024).     Social History  She reports that she has been smoking cigarettes. She started smoking about 39 years ago. She has a 19.9 pack-year smoking history. She has never used smokeless tobacco. She reports that she does not drink alcohol and does not use drugs.    Family History  Family History   Problem Relation Name Age of Onset    No Known Problems Mother      Cancer Father lyndsay         bladder    Cancer Brother          bladder        Allergies  Propranolol, Pantoprazole, and Sucralfate    Home Medications  (Not in a hospital admission)      Review of Systems  Review of Systems   Constitutional: Negative.    HENT: Negative.     Eyes: Negative.    Respiratory: Negative.     Cardiovascular: Negative.    Gastrointestinal: Negative.    Endocrine: Negative.    Genitourinary: Negative.    Musculoskeletal: Negative.    Skin: Negative.          Physical Exam  General: well-nourished, no acute distress  HEENT: PERRLA, EOM intact, no scleral icterus, moist MM  Respiratory: CTA bilaterally, normal work of breathing  Cardiovascular: RRR, no murmurs/rubs/gallops  Abdomen: Soft, nontender, nondistended, bowel sounds present, no masses palpated, no organomeagly  Extremities: no edema, no asterixis  Neuro: alert and oriented, CNII-XII grossly intact, moves all 4 extremities with no focal deficits     Last Recorded Vitals  Vitals:    11/25/24 1026   Pulse: 90   Temp: 35.8 °C (96.5 °F)   SpO2: 97%          Relevant Results    Current Outpatient Medications:     amLODIPine (Norvasc) 5 mg tablet, Take 1 tablet (5 mg) by mouth once daily., Disp: 90 tablet, Rfl: 1     ergocalciferol (Vitamin D-2) 50 MCG (2000 UT) capsule capsule, Take 1 capsule (50 mcg) by mouth once daily., Disp: 90 capsule, Rfl: 3    fluticasone-umeclidin-vilanter (Trelegy Ellipta) 100-62.5-25 mcg blister with device, Inhale 1 puff once daily., Disp: 60 each, Rfl: 3    losartan (Cozaar) 50 mg tablet, Take 1 tablet (50 mg) by mouth 2 times a day., Disp: 180 tablet, Rfl: 3    Synthroid 75 mcg tablet, TAKE 1 TABLET BY MOUTH EVERY MORNING on an empty stomach as directed, Disp: 90 tablet, Rfl: 3     Lab Results   Component Value Date    WBC 13.6 (H) 03/09/2024    HGB 12.2 03/09/2024    HCT 39.0 03/09/2024    MCV 65 (L) 03/09/2024     (H) 03/09/2024     Lab Results   Component Value Date    GLUCOSE 102 (H) 07/18/2024    CALCIUM 9.4 07/18/2024     07/18/2024    K 3.8 07/18/2024    CO2 31 07/18/2024     07/18/2024    BUN 8 07/18/2024    CREATININE 0.58 07/18/2024     Lab Results   Component Value Date    ALT 12 03/09/2024    AST 15 03/09/2024    ALKPHOS 91 03/09/2024    BILITOT 0.5 03/09/2024       Imaging:    FL GI CHOLANGIOGRAPHY INTRAOP S 3/19/2024 12:38 pm    INDICATION:  Signs/Symptoms:Cholecystectomy    COMPARISON:  None available.    ACCESSION NUMBER(S):  [id number]    ORDERING CLINICIAN:  ISABEL MART    TECHNIQUE:  7 seconds of intraoperative fluoroscopic guidance was utilized with 1  image obtained intraoperatively utilizing the C-arm.    Air kerma: 0.66 mGy    FINDINGS:  A single image obtained of the right upper quadrant with the C-arm  shows cannulation of the cystic duct with contrast injected into the  biliary tree. No intrahepatic or extrahepatic biliary ductal  dilatation, stricture, or irregularity is seen. Contrast is noted  within the duodenal. There is a tiny rounded filling defect seen in  the distal common bile duct which may represent a small air bubble or  tiny calculus.    IMPRESSION:  No biliary ductal obstruction.    Tiny rounded filling defect suggested within the  distal common bile  duct which may represent a small air bubble or perhaps a tiny  calculus.     ASSESSMENT/PLAN:    This is a 64-year-old woman with a 1 year history of constant epigastric pain.  Pain has persisted despite a recent cholecystectomy.  The pain does have a functional etiology and likely does account for its persistence despite the cholecystectomy.  It is interesting that the IntraOp cholangiogram revealed some possible filling defects in the lower bile duct.  Unclear if this could account for her symptoms but I do think this should be evaluated further.    Recommendations:  1.  Perform endoscopic ultrasound.  If the bile duct stone is seen can proceed with ERCP.  2.  Will check CBC, lipase, and hepatic function panel.  3.  If above testing is negative.  Recommend a trial of a medication such as nortriptyline for functional dyspeptic symptoms.  Patient has been hesitant to do this before as this was recommended during her visit to [facility] gastroenterology but may be more open to it depending on findings.  She can follow-up with me after her tests are completed.    I spent 40 minutes in the professional and overall care of this patient.    Jeremías Wiley MD

## 2024-11-25 ENCOUNTER — OFFICE VISIT (OUTPATIENT)
Dept: GASTROENTEROLOGY | Facility: HOSPITAL | Age: 64
End: 2024-11-25
Payer: COMMERCIAL

## 2024-11-25 ENCOUNTER — LAB (OUTPATIENT)
Dept: LAB | Facility: LAB | Age: 64
End: 2024-11-25
Payer: COMMERCIAL

## 2024-11-25 VITALS — HEART RATE: 90 BPM | WEIGHT: 98 LBS | BODY MASS INDEX: 17.36 KG/M2 | OXYGEN SATURATION: 97 % | TEMPERATURE: 96.5 F

## 2024-11-25 DIAGNOSIS — R10.13 EPIGASTRIC PAIN: ICD-10-CM

## 2024-11-25 DIAGNOSIS — R10.13 EPIGASTRIC PAIN: Primary | ICD-10-CM

## 2024-11-25 LAB
ERYTHROCYTE [DISTWIDTH] IN BLOOD BY AUTOMATED COUNT: 17.2 % (ref 11.5–14.5)
HCT VFR BLD AUTO: 46 % (ref 36–46)
HGB BLD-MCNC: 13.7 G/DL (ref 12–16)
MCH RBC QN AUTO: 20.1 PG (ref 26–34)
MCHC RBC AUTO-ENTMCNC: 29.8 G/DL (ref 32–36)
MCV RBC AUTO: 68 FL (ref 80–100)
NRBC BLD-RTO: 0 /100 WBCS (ref 0–0)
PLATELET # BLD AUTO: 407 X10*3/UL (ref 150–450)
RBC # BLD AUTO: 6.81 X10*6/UL (ref 4–5.2)
WBC # BLD AUTO: 11.9 X10*3/UL (ref 4.4–11.3)

## 2024-11-25 PROCEDURE — 99214 OFFICE O/P EST MOD 30 MIN: CPT | Performed by: INTERNAL MEDICINE

## 2024-11-25 PROCEDURE — 99204 OFFICE O/P NEW MOD 45 MIN: CPT | Performed by: INTERNAL MEDICINE

## 2024-11-25 PROCEDURE — 85027 COMPLETE CBC AUTOMATED: CPT

## 2024-11-25 PROCEDURE — 80076 HEPATIC FUNCTION PANEL: CPT

## 2024-11-25 PROCEDURE — 36415 COLL VENOUS BLD VENIPUNCTURE: CPT

## 2024-11-25 PROCEDURE — 83690 ASSAY OF LIPASE: CPT

## 2024-11-25 SDOH — ECONOMIC STABILITY: FOOD INSECURITY: WITHIN THE PAST 12 MONTHS, THE FOOD YOU BOUGHT JUST DIDN'T LAST AND YOU DIDN'T HAVE MONEY TO GET MORE.: NEVER TRUE

## 2024-11-25 SDOH — ECONOMIC STABILITY: FOOD INSECURITY: WITHIN THE PAST 12 MONTHS, YOU WORRIED THAT YOUR FOOD WOULD RUN OUT BEFORE YOU GOT MONEY TO BUY MORE.: NEVER TRUE

## 2024-11-25 ASSESSMENT — LIFESTYLE VARIABLES
HOW OFTEN DO YOU HAVE SIX OR MORE DRINKS ON ONE OCCASION: NEVER
HOW OFTEN DO YOU HAVE A DRINK CONTAINING ALCOHOL: NEVER
SKIP TO QUESTIONS 9-10: 1
HOW MANY STANDARD DRINKS CONTAINING ALCOHOL DO YOU HAVE ON A TYPICAL DAY: PATIENT DOES NOT DRINK
AUDIT-C TOTAL SCORE: 0

## 2024-11-25 ASSESSMENT — ENCOUNTER SYMPTOMS
CARDIOVASCULAR NEGATIVE: 1
CONSTITUTIONAL NEGATIVE: 1
GASTROINTESTINAL NEGATIVE: 1
ENDOCRINE NEGATIVE: 1
RESPIRATORY NEGATIVE: 1
EYES NEGATIVE: 1
MUSCULOSKELETAL NEGATIVE: 1

## 2024-11-25 ASSESSMENT — PATIENT HEALTH QUESTIONNAIRE - PHQ9
SUM OF ALL RESPONSES TO PHQ9 QUESTIONS 1 & 2: 0
1. LITTLE INTEREST OR PLEASURE IN DOING THINGS: NOT AT ALL
2. FEELING DOWN, DEPRESSED OR HOPELESS: NOT AT ALL

## 2024-11-25 ASSESSMENT — PAIN SCALES - GENERAL: PAINLEVEL_OUTOF10: 5

## 2024-11-25 NOTE — PATIENT INSTRUCTIONS
Thank you for coming in today.  I would like you to have an endoscopic ultrasound to evaluate your bile duct and pancreas.  I would like you also to have blood work done.  We can discuss starting medications for your symptoms after your test is completed.

## 2024-11-26 LAB
ALBUMIN SERPL BCP-MCNC: 4.9 G/DL (ref 3.4–5)
ALP SERPL-CCNC: 93 U/L (ref 33–136)
ALT SERPL W P-5'-P-CCNC: 14 U/L (ref 7–45)
AST SERPL W P-5'-P-CCNC: 16 U/L (ref 9–39)
BILIRUB DIRECT SERPL-MCNC: 0.1 MG/DL (ref 0–0.3)
BILIRUB SERPL-MCNC: 0.6 MG/DL (ref 0–1.2)
LIPASE SERPL-CCNC: 16 U/L (ref 9–82)
PROT SERPL-MCNC: 7.6 G/DL (ref 6.4–8.2)

## 2024-11-26 RX ORDER — FLUTICASONE FUROATE, UMECLIDINIUM BROMIDE AND VILANTEROL TRIFENATATE 100; 62.5; 25 UG/1; UG/1; UG/1
1 POWDER RESPIRATORY (INHALATION) DAILY
Qty: 60 EACH | Refills: 3 | Status: SHIPPED | OUTPATIENT
Start: 2024-11-26

## 2024-12-17 NOTE — H&P
History Of Present Illness  Olga Grajeda is a 64 y.o. female presenting with abdominal pain.     Past Medical History  Past Medical History:   Diagnosis Date    Bladder cancer (Multi) 2023    Disease of thyroid gland     Disorder of thyroid, unspecified 11/03/2021    Thyroid mass    Hypertension     Thyroid nodule      Surgical History  Past Surgical History:   Procedure Laterality Date    BLADDER TUMOR EXCISION  2023    CHOLECYSTECTOMY  03/19/2024    THYROID SURGERY      TOTAL THYROIDECTOMY  03/2022     Social History  She reports that she has been smoking cigarettes. She started smoking about 39 years ago. She has a 20 pack-year smoking history. She has never used smokeless tobacco. She reports current drug use. Drug: Marijuana. She reports that she does not drink alcohol.    Family History  Family History   Problem Relation Name Age of Onset    No Known Problems Mother      Cancer Father lyndsay         bladder    Cancer Brother          bladder        Allergies  Allergies   Allergen Reactions    Propranolol Hives and Unknown    Pantoprazole Rash    Sucralfate Rash     Review of Systems     Physical Exam     Last Recorded Vitals  There were no vitals taken for this visit.    Assessment/Plan   Abdominal pain     Proceed with EUS     Jeremías Wiley MD

## 2024-12-18 ENCOUNTER — HOSPITAL ENCOUNTER (OUTPATIENT)
Dept: GASTROENTEROLOGY | Facility: HOSPITAL | Age: 64
Discharge: HOME | End: 2024-12-18
Payer: COMMERCIAL

## 2024-12-18 ENCOUNTER — ANESTHESIA EVENT (OUTPATIENT)
Dept: GASTROENTEROLOGY | Facility: HOSPITAL | Age: 64
End: 2024-12-18
Payer: COMMERCIAL

## 2024-12-18 ENCOUNTER — ANESTHESIA (OUTPATIENT)
Dept: GASTROENTEROLOGY | Facility: HOSPITAL | Age: 64
End: 2024-12-18
Payer: COMMERCIAL

## 2024-12-18 VITALS
WEIGHT: 97 LBS | TEMPERATURE: 97.5 F | SYSTOLIC BLOOD PRESSURE: 137 MMHG | HEIGHT: 63 IN | BODY MASS INDEX: 17.19 KG/M2 | DIASTOLIC BLOOD PRESSURE: 70 MMHG | RESPIRATION RATE: 19 BRPM | HEART RATE: 64 BPM | OXYGEN SATURATION: 99 %

## 2024-12-18 DIAGNOSIS — R10.13 EPIGASTRIC PAIN: Primary | ICD-10-CM

## 2024-12-18 DIAGNOSIS — R10.13 EPIGASTRIC PAIN: ICD-10-CM

## 2024-12-18 PROCEDURE — 2720000007 HC OR 272 NO HCPCS

## 2024-12-18 PROCEDURE — 43237 ENDOSCOPIC US EXAM ESOPH: CPT | Performed by: INTERNAL MEDICINE

## 2024-12-18 PROCEDURE — 3700000001 HC GENERAL ANESTHESIA TIME - INITIAL BASE CHARGE

## 2024-12-18 PROCEDURE — 7100000009 HC PHASE TWO TIME - INITIAL BASE CHARGE

## 2024-12-18 PROCEDURE — 7100000010 HC PHASE TWO TIME - EACH INCREMENTAL 1 MINUTE

## 2024-12-18 PROCEDURE — 2500000004 HC RX 250 GENERAL PHARMACY W/ HCPCS (ALT 636 FOR OP/ED)

## 2024-12-18 PROCEDURE — 3700000002 HC GENERAL ANESTHESIA TIME - EACH INCREMENTAL 1 MINUTE

## 2024-12-18 RX ORDER — PROPOFOL 10 MG/ML
INJECTION, EMULSION INTRAVENOUS AS NEEDED
Status: DISCONTINUED | OUTPATIENT
Start: 2024-12-18 | End: 2024-12-18

## 2024-12-18 RX ORDER — LIDOCAINE HCL/PF 100 MG/5ML
SYRINGE (ML) INTRAVENOUS AS NEEDED
Status: DISCONTINUED | OUTPATIENT
Start: 2024-12-18 | End: 2024-12-18

## 2024-12-18 RX ORDER — BUSPIRONE HYDROCHLORIDE 10 MG/1
10 TABLET ORAL 2 TIMES DAILY
Qty: 60 TABLET | Refills: 3 | Status: SHIPPED | OUTPATIENT
Start: 2024-12-18 | End: 2025-12-18

## 2024-12-18 SDOH — HEALTH STABILITY: MENTAL HEALTH: CURRENT SMOKER: 1

## 2024-12-18 ASSESSMENT — PAIN SCALES - GENERAL
PAINLEVEL_OUTOF10: 0 - NO PAIN
PAINLEVEL_OUTOF10: 7
PAINLEVEL_OUTOF10: 0 - NO PAIN
PAIN_LEVEL: 0
PAINLEVEL_OUTOF10: 0 - NO PAIN
PAINLEVEL_OUTOF10: 0 - NO PAIN

## 2024-12-18 ASSESSMENT — PAIN - FUNCTIONAL ASSESSMENT
PAIN_FUNCTIONAL_ASSESSMENT: 0-10
PAIN_FUNCTIONAL_ASSESSMENT: 0-10
PAIN_FUNCTIONAL_ASSESSMENT: UNABLE TO SELF-REPORT
PAIN_FUNCTIONAL_ASSESSMENT: 0-10

## 2024-12-18 ASSESSMENT — PAIN DESCRIPTION - DESCRIPTORS: DESCRIPTORS: ACHING

## 2024-12-18 NOTE — ANESTHESIA POSTPROCEDURE EVALUATION
Patient: Olga Grajeda    Procedure Summary       Date: 12/18/24 Room / Location: [facility]    Anesthesia Start: 1208 Anesthesia Stop: 1234    Procedure: ENDOSCOPIC ULTRASOUND (UPPER) Diagnosis: Epigastric pain    Scheduled Providers: Jeremías Wiley MD; Ryan Benito MD; Solitario Bennett, BRADLY; Agustina Silveira RN Responsible Provider: Ryan Benito MD    Anesthesia Type: general ASA Status: 3            Anesthesia Type: general    Vitals Value Taken Time   /70 12/18/24 1310   Temp 36.4 °C (97.5 °F) 12/18/24 1235   Pulse 64 12/18/24 1310   Resp 19 12/18/24 1310   SpO2 99 % 12/18/24 1310       Anesthesia Post Evaluation    Patient location during evaluation: bedside  Patient participation: complete - patient participated  Level of consciousness: awake and alert  Pain score: 0  Pain management: satisfactory to patient  Airway patency: patent  Cardiovascular status: acceptable  Respiratory status: acceptable  Hydration status: acceptable  Postoperative Nausea and Vomiting: none        No notable events documented.

## 2024-12-18 NOTE — DISCHARGE INSTRUCTIONS

## 2024-12-18 NOTE — ANESTHESIA PREPROCEDURE EVALUATION
Patient: Olga Grajeda    Procedure Information       Date/Time: 12/18/24 1200    Scheduled providers: Jeremías Wiley MD; Ryan Benito MD; Solitario Bennett, BRADLY; Agustina Silveira, RN    Procedure: ENDOSCOPIC ULTRASOUND (UPPER)    Location: [facility]            Relevant Problems   Anesthesia (within normal limits)      Cardiac   (+) Hypertension      Pulmonary   (+) Chronic obstructive pulmonary disease (Multi)      Neuro (within normal limits)      GI   (+) Gastroesophageal reflux disease      /Renal (within normal limits)      Liver   (+) Cholecystitis      Endocrine   (+) Hypothyroidism, iatrogenic   (+) Other specified hypothyroidism      HEENT (within normal limits)      Skin (within normal limits)       Clinical information reviewed:   Tobacco  Allergies  Meds   Med Hx  Surg Hx   Fam Hx  Soc Hx        NPO Detail:  NPO/Void Status  Carbohydrate Drink Given Prior to Surgery? : N  Date of Last Liquid: 12/18/24  Time of Last Liquid: 0500  Date of Last Solid: 12/17/24  Time of Last Solid: 2230  Last Intake Type: Clear fluids (water)  Time of Last Void: 1100         Physical Exam    Airway  Mallampati: II  TM distance: >3 FB  Neck ROM: full     Cardiovascular   Rhythm: regular  Rate: normal     Dental   (+) upper dentures, lower dentures     Pulmonary - normal exam     Abdominal - normal exam             Anesthesia Plan    History of general anesthesia?: yes  History of complications of general anesthesia?: no    ASA 3     general     The patient is a current smoker.  Patient was previously instructed to abstain from smoking on day of procedure.  Patient smoked on day of procedure.    intravenous induction   Anesthetic plan and risks discussed with patient.  Use of blood products discussed with patient who consented to blood products.

## 2024-12-19 ASSESSMENT — PAIN SCALES - GENERAL: PAINLEVEL_OUTOF10: 0 - NO PAIN

## 2024-12-31 ENCOUNTER — APPOINTMENT (OUTPATIENT)
Dept: ENDOCRINOLOGY | Facility: CLINIC | Age: 64
End: 2024-12-31
Payer: COMMERCIAL

## 2025-01-07 ENCOUNTER — APPOINTMENT (OUTPATIENT)
Dept: ENDOCRINOLOGY | Facility: CLINIC | Age: 65
End: 2025-01-07
Payer: COMMERCIAL

## 2025-01-07 DIAGNOSIS — E89.0 STATUS POST TOTAL THYROIDECTOMY: Primary | ICD-10-CM

## 2025-01-07 DIAGNOSIS — E55.9 VITAMIN D DEFICIENCY: ICD-10-CM

## 2025-01-07 PROCEDURE — 99213 OFFICE O/P EST LOW 20 MIN: CPT | Performed by: STUDENT IN AN ORGANIZED HEALTH CARE EDUCATION/TRAINING PROGRAM

## 2025-01-07 RX ORDER — LEVOTHYROXINE SODIUM 75 UG/1
TABLET ORAL
Qty: 90 TABLET | Refills: 3 | Status: SHIPPED | OUTPATIENT
Start: 2025-01-07

## 2025-01-07 NOTE — PROGRESS NOTES
Subjective   Olga Grajeda is a 64 y.o. female with hx of MNG s/p thyroidectomy On virtual platform in for follow up.  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 Pathology showed follicular adenoma.  Had repeat blood work in April and TSH was 5.55 LT4 was increased to 75 mcg daily  Initially was on calcium 1000 mg TID weaned down to 1000 mg once daily but not taking it now  Stopped vitamin D 2000IU  Last seen in June 2023  Has bladder cancer s/p resection and ablation following every 3 months  Complains of Tightness at her upper epigastric  Currently taking synthroid 75 mcg daily appropriately   Gained around 20 lbs since last March conscious effort.   Eating breakfast lunch dinner and snacks.   Taking levothyroxine 75 mcg daily appropietly.  Feels good. Still has some discomfort/tightness in her upper abdomen. GI did workup negative. Taking buspar but no improvement.  Echo cardiac and MRI heart negative   Dx with COPD     No naps during the day. After dinner 2-3 hours nap.   Sleeps 4-5 hours at night  No palpitations   No tremors  Sometimes cold sometimes warm  No constipations (once in a while)  No compressive symptoms    Lab Results   Component Value Date    TSH 1.59 07/18/2024        Review of Systems  all pertinent systems reviewed and are otherwise negative   Objective   There were no vitals taken for this visit.  Virtual visit  Physical Exam  In no acute distress   No tremors.  PE was not complete since it is a virtual visit  Lab Results   Component Value Date    TSH 1.59 07/18/2024    FREET4 1.39 07/18/2024       Assessment/Plan   Olga Grajeda is a 63 y.o. female with hx of MNG s/p thyroidectomy in March coming in for follow up.  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 Pathology showed follicular adenoma.  Had repeat blood work in April and TSH was 5.55  LT4 was increased to 75 mcg daily  Initially was on calcium 1000 mg TID weaned down to 1000 mg once daily but not taking it now  Stopped vitamin D 2000IU  Last seen in June 2023  Has bladder cancer s/p resection and ablation following every 3 months  Complains of Tightness at her upper epigastric  Currently taking synthroid 75 mcg daily appropriately   Gained around 20 lbs since last March conscious effort.   Eating breakfast lunch dinner and snacks.  Feels good. Still has some discomfort/tightness in her upper abdomen. GI did workup negative. Taking buspar but no improvement.  Echo cardiac and MRI heart negative   Dx with COPD     Lab Results   Component Value Date    TSH 1.59 07/18/2024       Clinically euthyroid  Plan:  Continue Levothyroxine 75 mcg daily  to be taken on an empty stomach on its own 30min before other meds or food   Blood work including TFTs, Vitamin D and RFP     RTC in 1 year

## 2025-01-18 ENCOUNTER — LAB (OUTPATIENT)
Dept: LAB | Facility: LAB | Age: 65
End: 2025-01-18
Payer: COMMERCIAL

## 2025-01-18 DIAGNOSIS — E89.0 STATUS POST TOTAL THYROIDECTOMY: ICD-10-CM

## 2025-01-18 DIAGNOSIS — E55.9 VITAMIN D DEFICIENCY: ICD-10-CM

## 2025-01-18 LAB
25(OH)D3 SERPL-MCNC: 31 NG/ML (ref 30–100)
ALBUMIN SERPL BCP-MCNC: 4.6 G/DL (ref 3.4–5)
ANION GAP SERPL CALC-SCNC: 12 MMOL/L (ref 10–20)
BUN SERPL-MCNC: 15 MG/DL (ref 6–23)
CALCIUM SERPL-MCNC: 9.3 MG/DL (ref 8.6–10.6)
CHLORIDE SERPL-SCNC: 101 MMOL/L (ref 98–107)
CO2 SERPL-SCNC: 30 MMOL/L (ref 21–32)
CREAT SERPL-MCNC: 0.62 MG/DL (ref 0.5–1.05)
EGFRCR SERPLBLD CKD-EPI 2021: >90 ML/MIN/1.73M*2
GLUCOSE SERPL-MCNC: 147 MG/DL (ref 74–99)
PHOSPHATE SERPL-MCNC: 3.8 MG/DL (ref 2.5–4.9)
POTASSIUM SERPL-SCNC: 4 MMOL/L (ref 3.5–5.3)
SODIUM SERPL-SCNC: 139 MMOL/L (ref 136–145)
T4 FREE SERPL-MCNC: 1.39 NG/DL (ref 0.78–1.48)
TSH SERPL-ACNC: 1.38 MIU/L (ref 0.44–3.98)

## 2025-01-18 PROCEDURE — 82306 VITAMIN D 25 HYDROXY: CPT

## 2025-01-18 PROCEDURE — 80069 RENAL FUNCTION PANEL: CPT

## 2025-01-18 PROCEDURE — 84443 ASSAY THYROID STIM HORMONE: CPT

## 2025-01-18 PROCEDURE — 84439 ASSAY OF FREE THYROXINE: CPT

## 2025-02-26 ENCOUNTER — APPOINTMENT (OUTPATIENT)
Dept: UROLOGY | Facility: CLINIC | Age: 65
End: 2025-02-26
Payer: COMMERCIAL

## 2025-02-27 ENCOUNTER — APPOINTMENT (OUTPATIENT)
Dept: UROLOGY | Facility: CLINIC | Age: 65
End: 2025-02-27
Payer: COMMERCIAL

## 2025-02-27 VITALS — TEMPERATURE: 95 F | BODY MASS INDEX: 18.07 KG/M2 | WEIGHT: 102 LBS | HEIGHT: 63 IN

## 2025-02-27 DIAGNOSIS — C67.9 MALIGNANT NEOPLASM OF URINARY BLADDER, UNSPECIFIED SITE (MULTI): Primary | ICD-10-CM

## 2025-02-27 PROCEDURE — 88112 CYTOPATH CELL ENHANCE TECH: CPT

## 2025-02-27 PROCEDURE — 52224 CYSTOSCOPY AND TREATMENT: CPT | Performed by: STUDENT IN AN ORGANIZED HEALTH CARE EDUCATION/TRAINING PROGRAM

## 2025-02-27 PROCEDURE — 88112 CYTOPATH CELL ENHANCE TECH: CPT | Performed by: PATHOLOGY

## 2025-02-27 ASSESSMENT — PAIN SCALES - GENERAL: PAINLEVEL_OUTOF10: 0-NO PAIN

## 2025-02-27 NOTE — PROGRESS NOTES
HPI:  Proc (9/19/23): TURBT   Path: non-invasive papillary urothelial carcinoma, low-grade     Olga Grajeda is a 64 y.o. female self-referred for bladder mass. Hx of HTN. CT AP (8/9/23) showed no evidence of acute abnormality in the abdomen/pelvis, partially visualized emphysematous lung changes, there is a 1.2 cm nodularity in the right posterior dependent portion of the bladder without associated bladder wall thickening, which is nonspecific. S/p TURBT (9/19/23) with pathology showing non-invasive papillary urothelial carcinoma, low-grade. Presents for cystoscopy with fulguration. Denies hematuria. Doing well.      Smoker   Hx: HTN  SHx: thyroidectomy (3/22)   FHx: father (cause of death) and brother had bladder cancer, both worked with chemicals.     Cre: 0.62 (1/18/25)     CT AP (8/9/23): no evidence of acute abnormality in the abdomen/pelvis, partially visualized emphysematous lung changes, there is a 1.2 cm nodularity in the right posterior dependent portion of the bladder without associated bladder wall thickening, which is nonspecific.     Review of Systems:  All systems reviewed. Anything negative noted in the HPI.    Physical Exam:  Vitals signs reviewed.  Constitutional:      Appearance: Well-developed.  HENT:     Head: Normocephalic and atraumatic.  Neck:     Musculoskeletal: Normal range of motion.  Pulmonary:     Effort: Pulmonary effort is normal.  Musculoskeletal: Normal range of motion.  Skin:     General: Skin is warm and dry.  Neurological:     Mental Status: Alert and oriented to person, place, and time.  Psychiatric:        Behavior: Behavior normal.        Thought Content: Thought content normal.        Judgment: Judgment normal.    Cystoscopy    Date/Time: 2/27/2025 1:56 PM    Performed by: Idris Gutierrez MD  Authorized by: Idris Gutierrez MD    Procedure - Bladder Cystoscopy:     Procedure details: fulguration    Post-procedure:     Patient tolerance: Patient tolerated the  procedure well with no immediate complications      Comments:      Tiny papillary tumor along right posterior bladder wall, fulgurated.     Assessment/Plan   Olga Grajeda is a 64 y.o. female self-referred for bladder mass. Hx of HTN. CT AP (8/9/23) showed no evidence of acute abnormality in the abdomen/pelvis, partially visualized emphysematous lung changes, there is a 1.2 cm nodularity in the right posterior dependent portion of the bladder without associated bladder wall thickening, which is nonspecific. S/p TURBT (9/19/23) with pathology showing non-invasive papillary urothelial carcinoma, low-grade. Denies hematuria. Doing well. Management options including risks, benefits and alternatives discussed at length and all questions answered. Patient prefers to proceed with cystoscopy in 4mos.     Rx Keflex         Scribe Attestation  By signing my name below, Maddison ASHER Scribe   attest that this documentation has been prepared under the direction and in the presence of Idris Gutierrez MD.

## 2025-03-31 DIAGNOSIS — Z98.890 STATUS POST TOTAL THYROIDECTOMY: ICD-10-CM

## 2025-03-31 DIAGNOSIS — Z90.89 STATUS POST TOTAL THYROIDECTOMY: ICD-10-CM

## 2025-03-31 RX ORDER — LEVOTHYROXINE SODIUM 75 UG/1
TABLET ORAL
Qty: 90 TABLET | Refills: 3 | Status: SHIPPED | OUTPATIENT
Start: 2025-03-31

## 2025-04-03 DIAGNOSIS — Z72.0 TOBACCO USE: ICD-10-CM

## 2025-04-03 DIAGNOSIS — R06.00 DYSPNEA, UNSPECIFIED TYPE: ICD-10-CM

## 2025-04-04 DIAGNOSIS — Z72.0 TOBACCO USE: ICD-10-CM

## 2025-04-04 DIAGNOSIS — R06.00 DYSPNEA, UNSPECIFIED TYPE: ICD-10-CM

## 2025-04-04 RX ORDER — FLUTICASONE FUROATE, UMECLIDINIUM BROMIDE AND VILANTEROL TRIFENATATE 100; 62.5; 25 UG/1; UG/1; UG/1
1 POWDER RESPIRATORY (INHALATION) DAILY
Qty: 60 EACH | Refills: 3 | Status: SHIPPED | OUTPATIENT
Start: 2025-04-04

## 2025-04-04 RX ORDER — FLUTICASONE FUROATE, UMECLIDINIUM BROMIDE AND VILANTEROL TRIFENATATE 100; 62.5; 25 UG/1; UG/1; UG/1
1 POWDER RESPIRATORY (INHALATION) DAILY
Qty: 60 EACH | Refills: 3 | OUTPATIENT
Start: 2025-04-04

## 2025-04-07 DIAGNOSIS — Z98.890 STATUS POST TOTAL THYROIDECTOMY: ICD-10-CM

## 2025-04-07 DIAGNOSIS — Z90.89 STATUS POST TOTAL THYROIDECTOMY: ICD-10-CM

## 2025-04-08 ENCOUNTER — TELEPHONE (OUTPATIENT)
Facility: CLINIC | Age: 65
End: 2025-04-08
Payer: COMMERCIAL

## 2025-04-10 RX ORDER — LEVOTHYROXINE SODIUM 75 UG/1
TABLET ORAL
Qty: 90 TABLET | Refills: 3 | Status: SHIPPED | OUTPATIENT
Start: 2025-04-10

## 2025-04-29 DIAGNOSIS — I10 PRIMARY HYPERTENSION: ICD-10-CM

## 2025-04-30 RX ORDER — AMLODIPINE BESYLATE 5 MG/1
5 TABLET ORAL DAILY
Qty: 90 TABLET | Refills: 1 | OUTPATIENT
Start: 2025-04-30

## 2025-05-01 DIAGNOSIS — I10 PRIMARY HYPERTENSION: ICD-10-CM

## 2025-05-01 RX ORDER — AMLODIPINE BESYLATE 5 MG/1
5 TABLET ORAL DAILY
Qty: 90 TABLET | Refills: 0 | Status: SHIPPED | OUTPATIENT
Start: 2025-05-01

## 2025-06-11 NOTE — PROGRESS NOTES
HPI:  Proc (9/19/23): TURBT   Path: non-invasive papillary urothelial carcinoma, low-grade     Olga Grajeda is a 64 y.o. female self-referred for bladder mass. Hx of HTN. CT AP (8/9/23) showed no evidence of acute abnormality in the abdomen/pelvis, partially visualized emphysematous lung changes, there is a 1.2 cm nodularity in the right posterior dependent portion of the bladder without associated bladder wall thickening, which is nonspecific. S/p TURBT (9/19/23) with pathology showing non-invasive papillary urothelial carcinoma, low-grade. Presents for cystoscopy with fulguration. Denies hematuria. Doing well.      Smoker   Hx: HTN  SHx: thyroidectomy (3/22)   FHx: father (cause of death) and brother had bladder cancer, both worked with chemicals.     Cre: 0.62 (1/18/25)     CT AP (8/9/23): no evidence of acute abnormality in the abdomen/pelvis, partially visualized emphysematous lung changes, there is a 1.2 cm nodularity in the right posterior dependent portion of the bladder without associated bladder wall thickening, which is nonspecific.     Review of Systems:  All systems reviewed. Anything negative noted in the HPI.    Physical Exam:  Vitals signs reviewed.  Constitutional:      Appearance: Well-developed.  HENT:     Head: Normocephalic and atraumatic.  Neck:     Musculoskeletal: Normal range of motion.  Pulmonary:     Effort: Pulmonary effort is normal.  Musculoskeletal: Normal range of motion.  Skin:     General: Skin is warm and dry.  Neurological:     Mental Status: Alert and oriented to person, place, and time.  Psychiatric:        Behavior: Behavior normal.        Thought Content: Thought content normal.        Judgment: Judgment normal.    Cystoscopy    Date/Time: 6/12/2025 9:44 AM    Performed by: Idris Gutierrez MD  Authorized by: Idris Gutierrez MD    Procedure - Bladder Cystoscopy:     Procedure details: fulguration    Post-procedure:     Patient tolerance: Patient tolerated the  procedure well with no immediate complications      Comments:      2 tiny papillary lesions, one on posterior wall and one at bladder neck. Consistent with trigonitis.       Assessment/Plan   Olga Grajeda is a 64 y.o. female self-referred for bladder mass. Hx of HTN. CT AP (8/9/23) showed no evidence of acute abnormality in the abdomen/pelvis, partially visualized emphysematous lung changes, there is a 1.2 cm nodularity in the right posterior dependent portion of the bladder without associated bladder wall thickening, which is nonspecific. S/p TURBT (9/19/23) with pathology showing non-invasive papillary urothelial carcinoma, low-grade. Denies hematuria. Doing well. Management options including risks, benefits and alternatives discussed at length and all questions answered. Patient prefers to proceed with follow-up in 4mos for cystoscopy with fulguration.     Rx Keflex        Scribe Attestation  By signing my name below, IMaddison Scrvictoria   attest that this documentation has been prepared under the direction and in the presence of Idris Gutierrez MD.

## 2025-06-12 ENCOUNTER — APPOINTMENT (OUTPATIENT)
Dept: UROLOGY | Facility: CLINIC | Age: 65
End: 2025-06-12
Payer: COMMERCIAL

## 2025-06-12 VITALS — HEIGHT: 64 IN | BODY MASS INDEX: 17.07 KG/M2 | TEMPERATURE: 98.5 F | WEIGHT: 100 LBS

## 2025-06-12 DIAGNOSIS — Z79.2 NEED FOR PROPHYLACTIC ANTIBIOTIC: ICD-10-CM

## 2025-06-12 DIAGNOSIS — C67.9 MALIGNANT NEOPLASM OF URINARY BLADDER, UNSPECIFIED SITE (MULTI): Primary | ICD-10-CM

## 2025-06-12 LAB
POC APPEARANCE, URINE: CLEAR
POC BILIRUBIN, URINE: NEGATIVE
POC BLOOD, URINE: ABNORMAL
POC COLOR, URINE: YELLOW
POC GLUCOSE, URINE: NEGATIVE MG/DL
POC KETONES, URINE: NEGATIVE MG/DL
POC LEUKOCYTES, URINE: NEGATIVE
POC NITRITE,URINE: NEGATIVE
POC PH, URINE: 6 PH
POC PROTEIN, URINE: ABNORMAL MG/DL
POC SPECIFIC GRAVITY, URINE: 1.02
POC UROBILINOGEN, URINE: 0.2 EU/DL

## 2025-06-12 PROCEDURE — 52214 CYSTOSCOPY AND TREATMENT: CPT | Performed by: STUDENT IN AN ORGANIZED HEALTH CARE EDUCATION/TRAINING PROGRAM

## 2025-06-12 PROCEDURE — 81003 URINALYSIS AUTO W/O SCOPE: CPT | Performed by: STUDENT IN AN ORGANIZED HEALTH CARE EDUCATION/TRAINING PROGRAM

## 2025-06-12 PROCEDURE — 88112 CYTOPATH CELL ENHANCE TECH: CPT

## 2025-06-12 PROCEDURE — 88112 CYTOPATH CELL ENHANCE TECH: CPT | Performed by: PATHOLOGY

## 2025-06-12 PROCEDURE — 99214 OFFICE O/P EST MOD 30 MIN: CPT | Performed by: STUDENT IN AN ORGANIZED HEALTH CARE EDUCATION/TRAINING PROGRAM

## 2025-06-12 RX ORDER — CEPHALEXIN 500 MG/1
500 CAPSULE ORAL 2 TIMES DAILY
Qty: 2 CAPSULE | Refills: 0 | Status: SHIPPED | OUTPATIENT
Start: 2025-06-12 | End: 2025-06-13

## 2025-06-12 ASSESSMENT — PAIN SCALES - GENERAL: PAINLEVEL_OUTOF10: 0-NO PAIN

## 2025-06-26 ENCOUNTER — APPOINTMENT (OUTPATIENT)
Dept: UROLOGY | Facility: CLINIC | Age: 65
End: 2025-06-26
Payer: COMMERCIAL

## 2025-07-02 ENCOUNTER — OFFICE VISIT (OUTPATIENT)
Dept: PULMONOLOGY | Facility: CLINIC | Age: 65
End: 2025-07-02
Payer: COMMERCIAL

## 2025-07-02 VITALS
DIASTOLIC BLOOD PRESSURE: 81 MMHG | BODY MASS INDEX: 17.07 KG/M2 | RESPIRATION RATE: 16 BRPM | HEART RATE: 78 BPM | OXYGEN SATURATION: 98 % | WEIGHT: 100 LBS | SYSTOLIC BLOOD PRESSURE: 160 MMHG | HEIGHT: 64 IN

## 2025-07-02 DIAGNOSIS — J43.9 PULMONARY EMPHYSEMA, UNSPECIFIED EMPHYSEMA TYPE (MULTI): Primary | ICD-10-CM

## 2025-07-02 PROCEDURE — 99204 OFFICE O/P NEW MOD 45 MIN: CPT | Performed by: INTERNAL MEDICINE

## 2025-07-02 PROCEDURE — 3077F SYST BP >= 140 MM HG: CPT | Performed by: INTERNAL MEDICINE

## 2025-07-02 PROCEDURE — 3008F BODY MASS INDEX DOCD: CPT | Performed by: INTERNAL MEDICINE

## 2025-07-02 PROCEDURE — 3079F DIAST BP 80-89 MM HG: CPT | Performed by: INTERNAL MEDICINE

## 2025-07-02 RX ORDER — ALBUTEROL SULFATE 90 UG/1
1 INHALANT RESPIRATORY (INHALATION) ONCE
Status: CANCELLED | OUTPATIENT
Start: 2025-07-02

## 2025-07-02 RX ORDER — ALBUTEROL SULFATE 0.83 MG/ML
3 SOLUTION RESPIRATORY (INHALATION) ONCE
Status: CANCELLED | OUTPATIENT
Start: 2025-07-02 | End: 2025-07-02

## 2025-07-02 NOTE — PATIENT INSTRUCTIONS
Dear Olga Grajeda It was nice seeing you today. We discussed the following:     You have been having lower chest tightness for a couple of years. This tightness is highly unlikely to be caused by lung issues and is likely due to muscle tightness   You smoked for 40 years and quit 2 weeks ago.  We discussed obtaining a screening CT but you declined at this time.  I also recommend a breathing test and you can think about it.  I will communicate  my impression with the primary care physician      For scheduling purposes:    Call [phone] to schedule a breathing or a walking test     Call [phone] to schedule  EKG's, Echocardiograms and Cardiopulmonary Stress Tests.    Call [phone] to schedule Radiology tests such as Nuclear Medicine Stress Tests, CT Scans, and MRI's.    Should you have any questions Please Call my assistant Morro Butler at [phone] or our pulmonary nurse Taniya Mccabe [phone].

## 2025-07-02 NOTE — PROGRESS NOTES
Division of Pulmonary, Critical Care, and Sleep Medicine    Reason for the consultation     Olga Grajeda is a 64 y.o. female who presents to a [facility] for an evaluation for chest tighness of 2 years duration. I have independently interviewed and examined the patient in the office and reviewed available records.    Physician HPI (7/2/2025):    Olga Grajeda is a 64 year old prior smoker of 20 pack/year who quit 2 weeks ago has been having lower chest under the breast chest tightness, muscle tightness for the last couple of years.  She seen multiple gastroenterologist and had workup and was told it is not GI related.  She also describes dyspnea on exertion MMRC 3.  Wheezing no cough dry/productive. Denies fever or chills, night sweats, weight loss or ABHISHEK. Denies problems swallowing or chocking on food or cough when eating, or reflux. Denies tight skin, Raynaud's, joint pain or swelling. No rashes or ulcers. No dry eye or dry mouth. No orthopnea or PND or LE edema  Dyspnea Scale:      MMRC 3 - I stop for breath after walking about 100 yards or after a few minutes on level ground.      PMH  Medical History[1]  HTN  Levothyroxine    Previous pulmonary history:  no history of recurrent infections, or lung disease as a child.  No previous lung hx, never on oxygen or inhaler therapy. Currently on no supplemental oxygen.  Has never been to pulmonary rehab.     Hospitalization History: Has not been hospitalized over the last year for breathing related problem.    Immunization History:  Immunization History   Administered Date(s) Administered    Moderna SARS-CoV-2 Vaccination 04/07/2021       Family History:  Family History[2]  Father COPD    Social History:  Tobacco 0.5 for 40 years ppy quit 2 weeks ago,  ppy, quit   Worked as self employed. 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     Current Medications:  Current Outpatient Medications   Medication Instructions     amLODIPine (NORVASC) 5 mg, oral, Daily    busPIRone (BUSPAR) 10 mg, oral, 2 times daily    ergocalciferol (VITAMIN D-2) 50 mcg, oral, Daily    fluticasone-umeclidin-vilanter (Trelegy Ellipta) 100-62.5-25 mcg blister with device 1 puff, inhalation, Daily    levothyroxine (Synthroid) 75 mcg tablet TAKE 1 TABLET BY MOUTH EVERY MORNING on an empty stomach as directed  Patient is now asking for generic Synthroid    losartan (COZAAR) 50 mg, oral, 2 times daily        Drug Allergies/Intolerances:  RX Allergies[3]     Review of Systems:  All systems have been reviewed and are negative for findings pertinent to the chief complaint except as detailed below or described in the HPI.  Constitutional: Denies symptoms related to weight loss, fever or chills.  ENT: Denies symptoms related to hearing loss, sore throat, sinusitis and masses.  Cardiovascular: Denies symptoms related to chest pain, palpitations, edema and orthopnea.  Respiratory: Denies symptoms related to dyspnea, wheezing, cough, hemoptysis or increased sputum.  Gastrointestinal: Denies symptoms related to nausea, vomiting, diarrhea or constipation. GERD   Genitourinary: Denies symptoms related to hematuria, nocturia, frequency or urgency.  Musculoskeletal: under her breast tightness  Integumentary: Denies symptoms related to skin rashes, moles, pigment changes or ulcers.  Neurological: Denies symptoms related to dizziness, syncope, seizures, tremors or paralysis.  Psychiatric: Denies psychiatric symptoms associated with PTSD, depression, anxiety, psychosis or hallucinations.  Endocrine: Denies endocrine symptoms related to diabetes, polyuria, and cold/heat intolerance.  Hematologic: Denies hematologic symptoms associated with anemia, bruising, bleeding or lymph node tenderness.  Allergic: Denies allergic symptoms associated with allergy to meds, foods or contrast dye.  All other review of systems are negative and/or non-contributory.    Physical Examination:  There  "were no vitals taken for this visit. There is no height or weight on file to calculate BMI.    PREVIOUS WEIGHTS:  Wt Readings from Last 3 Encounters:   06/12/25 45.4 kg (100 lb)   02/27/25 46.3 kg (102 lb)   12/18/24 (!) 44 kg (97 lb)         General: ambulated independently; no acute distress; well-nourished; work of breathing was not increased; normal vocal character  HEENT: normocephalic; anicteric sclerae; conjunctivae not injected; nasal mucosa was unremarkable; oropharynx was clear without evidence of thrush; dentition was good.  Neck: supple; no lymphadenopathy or thyromegaly.  Chest: clear to auscultation bilaterally; no chest wall deformity. Tenderness over the muscle and the rib areas lower chest anteriorly   Cardiac: regular rhythm; no gallop or murmur.  Abdomen: soft; non-tender; non-distended; no hepatosplenomegaly.  Extremities: no leg edema; no digital clubbing; 2+ pulses  Psychiatric: did nappear anxious.      Diagnostic testing       -PFTs none     -Imaging: I have personally visualized the most recent imaging and I agree with the radiologist interpretation   02/2023  1.  No evidence of acute abnormality in the abdomen/pelvis. Partially visualized emphysematous lung changes.        Pathology:    -Lab   No results found for: \"ANATITER\", \"ANAPATTRN\", \"ANACO\", \"RO\", \"LA\", \"ARNP\", \"EMPSMRNP\", \"JIB\", \"ASCL\", \"EMPINTERP\", \"NONUHFIRE\", \"CITAB\", \"ANCA\", \"ANCPA\", \"ANCTI\"     @LABRESULTGRID(ANATITER:1,ANAPATTRN:1,ANACO:1,RO:1,LA:1,ARNP:1,EMPSMRNP:1,JIB:1,ASCL:1,EMPINTERP:1,NONUHFIRE:1,CITAB:1,ANCA:1,ANCPA:1,ANCTI:1)@     Eosinophils Absolute   Date Value   09/08/2023 0.19 K/UL   08/09/2023 0.27 x10E9/L   05/01/2023 0.25 x10E9/L       No results found for: \"IGE\"    Immunoglobulin IgE  No results found for: \"IGE\"    Respiratory Culture  No results found for: \"RESPCULTSM\", \"GRAMSTAIN\"  No results found for the last 90 days.      Fungal Culture  No results found for: \"FUNGALCULTSM\", \"FUNGALSMEAR\"    AFB " "Culture  No results found for: \"AFBCX\", \"AFBSTAIN\"    Assessment and Recommendations:    Ms. Grajeda is a 64 y.o. female       Problem List and Orders  64-year-old female smoker of 20 pack/year quit 2 weeks ago who presents with a 2-year history of anterior lower chest tightness,.  She has seen multiple specialists including GI doctor and without obvious etiology.  She has very mild emphysema on abdominal CT from 2023.  She does not have a PFT.  She was prescribed Trelegy by her primary care physician which per her helps some with her shortness of breath.  I extensively discussed with her that this tightness is unlikely caused by any pulmonary issues.  But given her smoking history I recommended a low-dose screening chest CT that she declined.  I also recommended to obtain a pulmonary function test to look for COPD  and she is going to think about it.  Patient was not very satisfied with the lack of definitive answer of  her lower chest tightness that to me appears to be muscular.             [1]   Past Medical History:  Diagnosis Date    Bladder cancer (Multi) 2023    Disease of thyroid gland     Disorder of thyroid, unspecified 11/03/2021    Thyroid mass    Hypertension     Thyroid nodule    [2]   Family History  Problem Relation Name Age of Onset    No Known Problems Mother      Cancer Father lyndsay         bladder    Cancer Brother          bladder   [3]   Allergies  Allergen Reactions    Propranolol Hives    Pantoprazole Rash    Sucralfate Rash     "

## 2025-07-15 ENCOUNTER — APPOINTMENT (OUTPATIENT)
Dept: PRIMARY CARE | Facility: CLINIC | Age: 65
End: 2025-07-15
Payer: COMMERCIAL

## 2025-07-15 VITALS
DIASTOLIC BLOOD PRESSURE: 83 MMHG | SYSTOLIC BLOOD PRESSURE: 170 MMHG | WEIGHT: 98 LBS | BODY MASS INDEX: 16.73 KG/M2 | HEIGHT: 64 IN | TEMPERATURE: 97.4 F | HEART RATE: 73 BPM

## 2025-07-15 DIAGNOSIS — Z13.6 SCREENING FOR CARDIOVASCULAR CONDITION: ICD-10-CM

## 2025-07-15 DIAGNOSIS — Z13.820 ENCOUNTER FOR OSTEOPOROSIS SCREENING IN ASYMPTOMATIC POSTMENOPAUSAL PATIENT: ICD-10-CM

## 2025-07-15 DIAGNOSIS — R06.00 DYSPNEA, UNSPECIFIED TYPE: ICD-10-CM

## 2025-07-15 DIAGNOSIS — Z53.20 BREAST CANCER SCREENING DECLINED: ICD-10-CM

## 2025-07-15 DIAGNOSIS — Z72.0 TOBACCO USE: ICD-10-CM

## 2025-07-15 DIAGNOSIS — I10 WHITE COAT SYNDROME WITH HYPERTENSION: Primary | ICD-10-CM

## 2025-07-15 DIAGNOSIS — I10 HYPERTENSION, UNSPECIFIED TYPE: ICD-10-CM

## 2025-07-15 DIAGNOSIS — E03.2 HYPOTHYROIDISM, IATROGENIC: ICD-10-CM

## 2025-07-15 DIAGNOSIS — Z13.1 SCREENING FOR DIABETES MELLITUS: ICD-10-CM

## 2025-07-15 DIAGNOSIS — I10 PRIMARY HYPERTENSION: ICD-10-CM

## 2025-07-15 DIAGNOSIS — Z78.0 ENCOUNTER FOR OSTEOPOROSIS SCREENING IN ASYMPTOMATIC POSTMENOPAUSAL PATIENT: ICD-10-CM

## 2025-07-15 DIAGNOSIS — E55.9 VITAMIN D DEFICIENCY: ICD-10-CM

## 2025-07-15 PROBLEM — R00.2 PALPITATIONS: Status: RESOLVED | Noted: 2023-04-10 | Resolved: 2025-07-15

## 2025-07-15 PROBLEM — Z09 POSTOPERATIVE EXAMINATION: Status: RESOLVED | Noted: 2024-04-01 | Resolved: 2025-07-15

## 2025-07-15 PROBLEM — R11.0 NAUSEA: Status: RESOLVED | Noted: 2023-04-10 | Resolved: 2025-07-15

## 2025-07-15 PROBLEM — R63.6 SEVERELY UNDERWEIGHT ADULT: Status: RESOLVED | Noted: 2024-01-29 | Resolved: 2025-07-15

## 2025-07-15 PROBLEM — K81.9 CHOLECYSTITIS: Status: RESOLVED | Noted: 2024-02-22 | Resolved: 2025-07-15

## 2025-07-15 PROBLEM — L50.9 HIVES: Status: RESOLVED | Noted: 2023-04-10 | Resolved: 2025-07-15

## 2025-07-15 PROCEDURE — 3077F SYST BP >= 140 MM HG: CPT | Performed by: INTERNAL MEDICINE

## 2025-07-15 PROCEDURE — 3008F BODY MASS INDEX DOCD: CPT | Performed by: INTERNAL MEDICINE

## 2025-07-15 PROCEDURE — 99214 OFFICE O/P EST MOD 30 MIN: CPT | Performed by: INTERNAL MEDICINE

## 2025-07-15 PROCEDURE — 3079F DIAST BP 80-89 MM HG: CPT | Performed by: INTERNAL MEDICINE

## 2025-07-15 RX ORDER — LOSARTAN POTASSIUM 50 MG/1
50 TABLET ORAL 2 TIMES DAILY
Qty: 180 TABLET | Refills: 3 | Status: SHIPPED | OUTPATIENT
Start: 2025-07-15 | End: 2026-07-15

## 2025-07-15 RX ORDER — AMLODIPINE BESYLATE 5 MG/1
5 TABLET ORAL DAILY
Qty: 90 TABLET | Refills: 3 | Status: SHIPPED | OUTPATIENT
Start: 2025-07-15

## 2025-07-15 RX ORDER — FLUTICASONE FUROATE, UMECLIDINIUM BROMIDE AND VILANTEROL TRIFENATATE 100; 62.5; 25 UG/1; UG/1; UG/1
1 POWDER RESPIRATORY (INHALATION) DAILY
Qty: 60 EACH | Refills: 9 | Status: SHIPPED | OUTPATIENT
Start: 2025-07-15

## 2025-07-15 ASSESSMENT — ENCOUNTER SYMPTOMS
FEVER: 0
POLYDIPSIA: 0
CHILLS: 0
SHORTNESS OF BREATH: 0
COUGH: 0
PALPITATIONS: 0

## 2025-07-15 ASSESSMENT — PAIN SCALES - GENERAL: PAINLEVEL_OUTOF10: 0-NO PAIN

## 2025-07-15 NOTE — PROGRESS NOTES
Subjective   Patient ID: Olga Grajeda is a 64 y.o. female who presents for Annual Exam.    63yo F presents for a follow up appointment, her first in over 1 year she was significantly overdue.  She has a history of not desiring preventative testing.  She has previously declined colon cancer screening and mammography.  Recently she had requested refills which I was unable to provide due to overdue follow-up at the time of her scheduling I did provide her with 4 refills to cover her through today's appointment.  She has a history of whitecoat hypertension in the setting of primary hypertension.  She takes her medications as directed checks her blood pressures at home which typically run 130-140 systolic and diastolic in the 70s on average.  She takes her medications as directed with no complications.  This includes the Trelegy, she has no history of thrush.  She uses the medication as directed and reports no significant coughing wheezing shortness of breath.  She has recently quit smoking, approximately 30 days ago.  She has felt no complications from her treatment plan and reports no wheezing coughing shortness of breath at this time.  As part of today's encounter I discussed thoroughly the preventative options available to the patient including colonoscopy and other forms of colon cancer screening, mammography for breast cancer screening bone density testing for screening for osteoporosis and blood work recommendations.  The patient declines colon cancer screening and mammography without consideration for them at this time.  She is contemplative for the possibility of doing a bone density was agreeable to having an order in the system if she decides to go forward with scheduling 1.  I advised her of the blood work and advised her to complete at her convenience although she is welcome to wait until her next routine panel with one of her other doctors to complete mine at the same time.  She has no acute concerns  "otherwise at the time of today's encounter         Review of Systems   Constitutional:  Negative for chills and fever.   Respiratory:  Negative for cough and shortness of breath.    Cardiovascular:  Negative for chest pain and palpitations.   Endocrine: Negative for polydipsia and polyuria.       Objective   /83 (BP Location: Right arm, Patient Position: Sitting, BP Cuff Size: Adult)   Pulse 73   Temp 36.3 °C (97.4 °F) (Temporal)   Ht 1.613 m (5' 3.5\")   Wt (!) 44.5 kg (98 lb)   BMI 17.09 kg/m²     Physical Exam  Constitutional:       Appearance: Normal appearance.   HENT:      Head: Normocephalic and atraumatic.   Eyes:      Extraocular Movements: Extraocular movements intact.      Pupils: Pupils are equal, round, and reactive to light.   Neck:      Thyroid: No thyroid mass or thyromegaly.      Vascular: No carotid bruit.   Cardiovascular:      Rate and Rhythm: Normal rate and regular rhythm.      Heart sounds: No murmur heard.     No friction rub. No gallop.   Pulmonary:      Effort: No respiratory distress.      Breath sounds: No wheezing, rhonchi or rales.   Musculoskeletal:      Cervical back: Neck supple.      Right lower leg: No edema.      Left lower leg: No edema.   Neurological:      Mental Status: She is alert.         Assessment/Plan   Assessment & Plan  White coat syndrome with hypertension    Orders:    Lipid Panel; Future    Vitamin D 25-Hydroxy,Total (for eval of Vitamin D levels); Future    Hemoglobin A1C; Future    Encounter for osteoporosis screening in asymptomatic postmenopausal patient    Orders:    XR DEXA bone density; Future    Hypothyroidism, iatrogenic         Dyspnea, unspecified type    Orders:    fluticasone-umeclidin-vilanter (Trelegy Ellipta) 100-62.5-25 mcg blister with device; Inhale 1 puff once daily.    Screening for cardiovascular condition    Orders:    Lipid Panel; Future    Vitamin D 25-Hydroxy,Total (for eval of Vitamin D levels); Future    Hemoglobin A1C; " Future    Screening for diabetes mellitus    Orders:    Lipid Panel; Future    Vitamin D 25-Hydroxy,Total (for eval of Vitamin D levels); Future    Hemoglobin A1C; Future    Breast cancer screening declined         Primary hypertension    Orders:    amLODIPine (Norvasc) 5 mg tablet; Take 1 tablet (5 mg) by mouth once daily.    Hypertension, unspecified type    Orders:    losartan (Cozaar) 50 mg tablet; Take 1 tablet (50 mg) by mouth 2 times a day.    Vitamin D deficiency    Orders:    Lipid Panel; Future    Vitamin D 25-Hydroxy,Total (for eval of Vitamin D levels); Future    Hemoglobin A1C; Future    Tobacco use    Orders:    fluticasone-umeclidin-vilanter (Trelegy Ellipta) 100-62.5-25 mcg blister with device; Inhale 1 puff once daily.    XR DEXA bone density; Future

## 2025-07-15 NOTE — ASSESSMENT & PLAN NOTE
Orders:    Lipid Panel; Future    Vitamin D 25-Hydroxy,Total (for eval of Vitamin D levels); Future    Hemoglobin A1C; Future

## 2025-08-06 ENCOUNTER — APPOINTMENT (OUTPATIENT)
Dept: RESPIRATORY THERAPY | Facility: HOSPITAL | Age: 65
End: 2025-08-06
Payer: COMMERCIAL

## 2025-08-06 DIAGNOSIS — Z12.31 ENCOUNTER FOR SCREENING MAMMOGRAM FOR BREAST CANCER: ICD-10-CM

## 2025-10-15 ENCOUNTER — APPOINTMENT (OUTPATIENT)
Dept: UROLOGY | Facility: HOSPITAL | Age: 65
End: 2025-10-15
Payer: COMMERCIAL

## 2025-10-16 ENCOUNTER — APPOINTMENT (OUTPATIENT)
Dept: UROLOGY | Facility: CLINIC | Age: 65
End: 2025-10-16
Payer: COMMERCIAL

## (undated) DEVICE — RETRIEVAL SYSTEM, MONARCH, 10MM DISP ENDOSCOPIC

## (undated) DEVICE — GLOVE, SURGICAL, PROTEXIS PI BLUE W/NEUTHERA, 8.5, PF, LF

## (undated) DEVICE — CLIP, ENDO APPLIER LIGAMAX 5MM

## (undated) DEVICE — SUTURE, MONOCRYL, 4-0, 27 IN, PS-2, UNDYED

## (undated) DEVICE — TUBE SET, PNEUMOCLEAR, SMOKE EVACU, HIGH-FLOW

## (undated) DEVICE — SUTURE, VICRYL, 0, 27 IN, UR-6, VIOLET

## (undated) DEVICE — ACCESS SYS, KII OPTICAL, Z-THREAD, 11X100CM

## (undated) DEVICE — Device

## (undated) DEVICE — TROCAR, KII OPTICAL BLADELESS 5MM Z THREAD 100MM LNGTH

## (undated) DEVICE — SYRINGE, 20 CC, LUER LOCK

## (undated) DEVICE — SLEEVE, KII, Z-THREAD, 5X100CM

## (undated) DEVICE — NEEDLE, HYPODERMIC, PROEDGE, 22G X 1.5, BLACK

## (undated) DEVICE — GOWN, SURGICAL, ECLIPSE, FABRIC, 2XL, REINFORCED

## (undated) DEVICE — DRAPE, C-ARM IMAGE

## (undated) DEVICE — CARE KIT, LAPAROSCOPIC, ADVANCED

## (undated) DEVICE — CORD, MONOPOLAR HIGH FREQUENCY, 8MM PLUG, 300CM

## (undated) DEVICE — SOLUTION, INJECTION, SODIUM CHLORIDE 9%, 3000ML

## (undated) DEVICE — GLOVE, SURGICAL, PROTEXIS PI , 8.0, PF, LF